# Patient Record
Sex: FEMALE | Race: WHITE | NOT HISPANIC OR LATINO | Employment: OTHER | URBAN - METROPOLITAN AREA
[De-identification: names, ages, dates, MRNs, and addresses within clinical notes are randomized per-mention and may not be internally consistent; named-entity substitution may affect disease eponyms.]

---

## 2021-05-26 ENCOUNTER — RECORDS - HEALTHEAST (OUTPATIENT)
Dept: ADMINISTRATIVE | Facility: CLINIC | Age: 81
End: 2021-05-26

## 2021-05-27 ENCOUNTER — RECORDS - HEALTHEAST (OUTPATIENT)
Dept: ADMINISTRATIVE | Facility: CLINIC | Age: 81
End: 2021-05-27

## 2021-05-28 ENCOUNTER — RECORDS - HEALTHEAST (OUTPATIENT)
Dept: ADMINISTRATIVE | Facility: CLINIC | Age: 81
End: 2021-05-28

## 2021-05-29 ENCOUNTER — RECORDS - HEALTHEAST (OUTPATIENT)
Dept: ADMINISTRATIVE | Facility: CLINIC | Age: 81
End: 2021-05-29

## 2021-05-31 ENCOUNTER — RECORDS - HEALTHEAST (OUTPATIENT)
Dept: ADMINISTRATIVE | Facility: CLINIC | Age: 81
End: 2021-05-31

## 2021-06-01 ENCOUNTER — RECORDS - HEALTHEAST (OUTPATIENT)
Dept: ADMINISTRATIVE | Facility: CLINIC | Age: 81
End: 2021-06-01

## 2021-09-20 ENCOUNTER — LAB REQUISITION (OUTPATIENT)
Dept: LAB | Facility: CLINIC | Age: 81
End: 2021-09-20
Payer: COMMERCIAL

## 2021-09-20 DIAGNOSIS — E55.9 VITAMIN D DEFICIENCY, UNSPECIFIED: ICD-10-CM

## 2021-09-20 DIAGNOSIS — E83.51 HYPOCALCEMIA: ICD-10-CM

## 2021-09-21 LAB
ANION GAP SERPL CALCULATED.3IONS-SCNC: 9 MMOL/L (ref 5–18)
BUN SERPL-MCNC: 15 MG/DL (ref 8–28)
CALCIUM SERPL-MCNC: 7.6 MG/DL (ref 8.5–10.5)
CHLORIDE BLD-SCNC: 109 MMOL/L (ref 98–107)
CO2 SERPL-SCNC: 24 MMOL/L (ref 22–31)
CREAT SERPL-MCNC: 0.71 MG/DL (ref 0.6–1.1)
DEPRECATED CALCIDIOL+CALCIFEROL SERPL-MC: 43 UG/L (ref 30–80)
GFR SERPL CREATININE-BSD FRML MDRD: 81 ML/MIN/1.73M2
GLUCOSE BLD-MCNC: 95 MG/DL (ref 70–125)
POTASSIUM BLD-SCNC: 4.2 MMOL/L (ref 3.5–5)
SODIUM SERPL-SCNC: 142 MMOL/L (ref 136–145)
TSH SERPL DL<=0.005 MIU/L-ACNC: 2.67 UIU/ML (ref 0.3–5)

## 2021-09-21 PROCEDURE — 82306 VITAMIN D 25 HYDROXY: CPT | Mod: ORL | Performed by: NURSE PRACTITIONER

## 2021-09-21 PROCEDURE — 84443 ASSAY THYROID STIM HORMONE: CPT | Mod: ORL | Performed by: NURSE PRACTITIONER

## 2021-09-21 PROCEDURE — 36415 COLL VENOUS BLD VENIPUNCTURE: CPT | Mod: ORL | Performed by: NURSE PRACTITIONER

## 2021-09-21 PROCEDURE — P9603 ONE-WAY ALLOW PRORATED MILES: HCPCS | Mod: ORL | Performed by: NURSE PRACTITIONER

## 2021-09-21 PROCEDURE — 80048 BASIC METABOLIC PNL TOTAL CA: CPT | Mod: ORL | Performed by: NURSE PRACTITIONER

## 2021-10-05 ENCOUNTER — TRANSFERRED RECORDS (OUTPATIENT)
Dept: HEALTH INFORMATION MANAGEMENT | Facility: CLINIC | Age: 81
End: 2021-10-05

## 2021-10-07 ENCOUNTER — LAB REQUISITION (OUTPATIENT)
Dept: LAB | Facility: CLINIC | Age: 81
End: 2021-10-07
Payer: COMMERCIAL

## 2021-10-07 ENCOUNTER — TRANSFERRED RECORDS (OUTPATIENT)
Dept: HEALTH INFORMATION MANAGEMENT | Facility: CLINIC | Age: 81
End: 2021-10-07

## 2021-10-07 DIAGNOSIS — R18.8 OTHER ASCITES: ICD-10-CM

## 2021-10-07 DIAGNOSIS — R60.9 EDEMA, UNSPECIFIED: ICD-10-CM

## 2021-10-07 LAB
ANION GAP SERPL CALCULATED.3IONS-SCNC: 9 MMOL/L (ref 5–18)
BNP SERPL-MCNC: 316 PG/ML (ref 0–151)
BUN SERPL-MCNC: 13 MG/DL (ref 8–28)
CALCIUM SERPL-MCNC: 8 MG/DL (ref 8.5–10.5)
CHLORIDE BLD-SCNC: 107 MMOL/L (ref 98–107)
CO2 SERPL-SCNC: 25 MMOL/L (ref 22–31)
CREAT SERPL-MCNC: 0.83 MG/DL (ref 0.6–1.1)
ERYTHROCYTE [DISTWIDTH] IN BLOOD BY AUTOMATED COUNT: 22.1 % (ref 10–15)
GFR SERPL CREATININE-BSD FRML MDRD: 67 ML/MIN/1.73M2
GLUCOSE BLD-MCNC: 145 MG/DL (ref 70–125)
HCT VFR BLD AUTO: 25.8 % (ref 35–47)
HGB BLD-MCNC: 7.1 G/DL (ref 11.7–15.7)
MCH RBC QN AUTO: 21.6 PG (ref 26.5–33)
MCHC RBC AUTO-ENTMCNC: 27.5 G/DL (ref 31.5–36.5)
MCV RBC AUTO: 78 FL (ref 78–100)
PLATELET # BLD AUTO: 123 10E3/UL (ref 150–450)
POTASSIUM BLD-SCNC: 3.8 MMOL/L (ref 3.5–5)
RBC # BLD AUTO: 3.29 10E6/UL (ref 3.8–5.2)
SODIUM SERPL-SCNC: 141 MMOL/L (ref 136–145)
WBC # BLD AUTO: 3.7 10E3/UL (ref 4–11)

## 2021-10-07 PROCEDURE — P9603 ONE-WAY ALLOW PRORATED MILES: HCPCS | Mod: ORL | Performed by: NURSE PRACTITIONER

## 2021-10-07 PROCEDURE — 83880 ASSAY OF NATRIURETIC PEPTIDE: CPT | Mod: ORL | Performed by: NURSE PRACTITIONER

## 2021-10-07 PROCEDURE — 80048 BASIC METABOLIC PNL TOTAL CA: CPT | Mod: ORL | Performed by: NURSE PRACTITIONER

## 2021-10-07 PROCEDURE — 36415 COLL VENOUS BLD VENIPUNCTURE: CPT | Mod: ORL | Performed by: NURSE PRACTITIONER

## 2021-10-07 PROCEDURE — 85027 COMPLETE CBC AUTOMATED: CPT | Mod: ORL | Performed by: NURSE PRACTITIONER

## 2021-10-08 ENCOUNTER — APPOINTMENT (OUTPATIENT)
Dept: CT IMAGING | Facility: CLINIC | Age: 81
DRG: 689 | End: 2021-10-08
Attending: STUDENT IN AN ORGANIZED HEALTH CARE EDUCATION/TRAINING PROGRAM
Payer: COMMERCIAL

## 2021-10-08 ENCOUNTER — HOSPITAL ENCOUNTER (INPATIENT)
Facility: CLINIC | Age: 81
LOS: 1 days | Discharge: ANOTHER HOSPITAL | DRG: 689 | End: 2021-10-09
Attending: STUDENT IN AN ORGANIZED HEALTH CARE EDUCATION/TRAINING PROGRAM | Admitting: EMERGENCY MEDICINE
Payer: COMMERCIAL

## 2021-10-08 DIAGNOSIS — I62.00 SUBDURAL HEMORRHAGE (H): Primary | ICD-10-CM

## 2021-10-08 DIAGNOSIS — N39.0 URINARY TRACT INFECTION WITHOUT HEMATURIA, SITE UNSPECIFIED: ICD-10-CM

## 2021-10-08 DIAGNOSIS — I82.401 ACUTE DEEP VEIN THROMBOSIS (DVT) OF RIGHT LOWER EXTREMITY, UNSPECIFIED VEIN (H): ICD-10-CM

## 2021-10-08 DIAGNOSIS — K92.2 GASTROINTESTINAL HEMORRHAGE, UNSPECIFIED GASTROINTESTINAL HEMORRHAGE TYPE: ICD-10-CM

## 2021-10-08 PROBLEM — D64.9 ANEMIA: Status: ACTIVE | Noted: 2021-10-08

## 2021-10-08 LAB
ABO/RH(D): NORMAL
ALBUMIN SERPL-MCNC: 2.3 G/DL (ref 3.5–5)
ALBUMIN UR-MCNC: 10 MG/DL
ALP SERPL-CCNC: 77 U/L (ref 45–120)
ALT SERPL W P-5'-P-CCNC: 22 U/L (ref 0–45)
ANION GAP SERPL CALCULATED.3IONS-SCNC: 6 MMOL/L (ref 5–18)
ANTIBODY SCREEN: NEGATIVE
APPEARANCE UR: CLEAR
AST SERPL W P-5'-P-CCNC: 51 U/L (ref 0–40)
ATRIAL RATE - MUSE: 67 BPM
BACTERIA #/AREA URNS HPF: ABNORMAL /HPF
BASE EXCESS BLDV CALC-SCNC: 3.7 MMOL/L
BASOPHILS # BLD AUTO: 0 10E3/UL (ref 0–0.2)
BASOPHILS NFR BLD AUTO: 0 %
BILIRUB SERPL-MCNC: 0.6 MG/DL (ref 0–1)
BILIRUB UR QL STRIP: NEGATIVE
BLD PROD TYP BPU: NORMAL
BLOOD COMPONENT TYPE: NORMAL
BUN SERPL-MCNC: 15 MG/DL (ref 8–28)
CALCIUM SERPL-MCNC: 8.4 MG/DL (ref 8.5–10.5)
CAOX CRY #/AREA URNS HPF: ABNORMAL /HPF
CHLORIDE BLD-SCNC: 108 MMOL/L (ref 98–107)
CO2 SERPL-SCNC: 26 MMOL/L (ref 22–31)
CODING SYSTEM: NORMAL
COLOR UR AUTO: YELLOW
CREAT SERPL-MCNC: 0.79 MG/DL (ref 0.6–1.1)
CROSSMATCH: NORMAL
DIASTOLIC BLOOD PRESSURE - MUSE: 61 MMHG
EOSINOPHIL # BLD AUTO: 0.1 10E3/UL (ref 0–0.7)
EOSINOPHIL NFR BLD AUTO: 3 %
ERYTHROCYTE [DISTWIDTH] IN BLOOD BY AUTOMATED COUNT: 22.1 % (ref 10–15)
GFR SERPL CREATININE-BSD FRML MDRD: 71 ML/MIN/1.73M2
GLUCOSE BLD-MCNC: 121 MG/DL (ref 70–125)
GLUCOSE UR STRIP-MCNC: NEGATIVE MG/DL
HCO3 BLDV-SCNC: 27 MMOL/L (ref 24–30)
HCT VFR BLD AUTO: 24.5 % (ref 35–47)
HGB BLD-MCNC: 6.8 G/DL (ref 11.7–15.7)
HGB UR QL STRIP: NEGATIVE
IMM GRANULOCYTES # BLD: 0 10E3/UL
IMM GRANULOCYTES NFR BLD: 1 %
INTERNAL QC CHECK POCT: ABNORMAL
INTERPRETATION ECG - MUSE: NORMAL
ISSUE DATE AND TIME: NORMAL
KETONES UR STRIP-MCNC: NEGATIVE MG/DL
LEUKOCYTE ESTERASE UR QL STRIP: NEGATIVE
LYMPHOCYTES # BLD AUTO: 0.8 10E3/UL (ref 0.8–5.3)
LYMPHOCYTES NFR BLD AUTO: 20 %
MCH RBC QN AUTO: 21.9 PG (ref 26.5–33)
MCHC RBC AUTO-ENTMCNC: 27.8 G/DL (ref 31.5–36.5)
MCV RBC AUTO: 79 FL (ref 78–100)
MONOCYTES # BLD AUTO: 0.5 10E3/UL (ref 0–1.3)
MONOCYTES NFR BLD AUTO: 12 %
MUCOUS THREADS #/AREA URNS LPF: PRESENT /LPF
NEUTROPHILS # BLD AUTO: 2.6 10E3/UL (ref 1.6–8.3)
NEUTROPHILS NFR BLD AUTO: 64 %
NITRATE UR QL: POSITIVE
NRBC # BLD AUTO: 0 10E3/UL
NRBC BLD AUTO-RTO: 0 /100
OCCULT BLOOD POCT: POSITIVE
OXYHGB MFR BLDV: 28.1 % (ref 70–75)
P AXIS - MUSE: NORMAL DEGREES
PCO2 BLDV: 51 MM HG (ref 35–50)
PH BLDV: 7.37 [PH] (ref 7.35–7.45)
PH UR STRIP: 6 [PH] (ref 5–7)
PLATELET # BLD AUTO: 104 10E3/UL (ref 150–450)
PO2 BLDV: 22 MM HG (ref 25–47)
POTASSIUM BLD-SCNC: 4.7 MMOL/L (ref 3.5–5)
PR INTERVAL - MUSE: 164 MS
PROT SERPL-MCNC: 6.9 G/DL (ref 6–8)
QRS DURATION - MUSE: 84 MS
QT - MUSE: 432 MS
QTC - MUSE: 456 MS
R AXIS - MUSE: 14 DEGREES
RBC # BLD AUTO: 3.11 10E6/UL (ref 3.8–5.2)
RBC URINE: 2 /HPF
SAO2 % BLDV: 28.8 % (ref 70–75)
SARS-COV-2 RNA RESP QL NAA+PROBE: NEGATIVE
SODIUM SERPL-SCNC: 140 MMOL/L (ref 136–145)
SP GR UR STRIP: 1.02 (ref 1–1.03)
SPECIMEN EXPIRATION DATE: NORMAL
SQUAMOUS EPITHELIAL: 1 /HPF
SYSTOLIC BLOOD PRESSURE - MUSE: 140 MMHG
T AXIS - MUSE: 74 DEGREES
TEST CARD EXPIRATION DATE: ABNORMAL
TEST CARD LOT NUMBER: ABNORMAL
TROPONIN I SERPL-MCNC: 0.01 NG/ML (ref 0–0.29)
UNIT ABO/RH: NORMAL
UNIT NUMBER: NORMAL
UNIT STATUS: NORMAL
UNIT TYPE ISBT: 6200
UROBILINOGEN UR STRIP-MCNC: 2 MG/DL
VENTRICULAR RATE- MUSE: 67 BPM
WBC # BLD AUTO: 3.9 10E3/UL (ref 4–11)
WBC URINE: 1 /HPF

## 2021-10-08 PROCEDURE — C9803 HOPD COVID-19 SPEC COLLECT: HCPCS

## 2021-10-08 PROCEDURE — 74177 CT ABD & PELVIS W/CONTRAST: CPT

## 2021-10-08 PROCEDURE — 36415 COLL VENOUS BLD VENIPUNCTURE: CPT | Performed by: STUDENT IN AN ORGANIZED HEALTH CARE EDUCATION/TRAINING PROGRAM

## 2021-10-08 PROCEDURE — 81001 URINALYSIS AUTO W/SCOPE: CPT | Performed by: STUDENT IN AN ORGANIZED HEALTH CARE EDUCATION/TRAINING PROGRAM

## 2021-10-08 PROCEDURE — 82272 OCCULT BLD FECES 1-3 TESTS: CPT | Performed by: STUDENT IN AN ORGANIZED HEALTH CARE EDUCATION/TRAINING PROGRAM

## 2021-10-08 PROCEDURE — 99223 1ST HOSP IP/OBS HIGH 75: CPT | Performed by: INTERNAL MEDICINE

## 2021-10-08 PROCEDURE — P9016 RBC LEUKOCYTES REDUCED: HCPCS | Performed by: STUDENT IN AN ORGANIZED HEALTH CARE EDUCATION/TRAINING PROGRAM

## 2021-10-08 PROCEDURE — 70450 CT HEAD/BRAIN W/O DYE: CPT

## 2021-10-08 PROCEDURE — 250N000011 HC RX IP 250 OP 636: Performed by: STUDENT IN AN ORGANIZED HEALTH CARE EDUCATION/TRAINING PROGRAM

## 2021-10-08 PROCEDURE — 96361 HYDRATE IV INFUSION ADD-ON: CPT

## 2021-10-08 PROCEDURE — 99285 EMERGENCY DEPT VISIT HI MDM: CPT | Mod: 25

## 2021-10-08 PROCEDURE — U0005 INFEC AGEN DETEC AMPLI PROBE: HCPCS | Performed by: FAMILY MEDICINE

## 2021-10-08 PROCEDURE — 86900 BLOOD TYPING SEROLOGIC ABO: CPT | Performed by: STUDENT IN AN ORGANIZED HEALTH CARE EDUCATION/TRAINING PROGRAM

## 2021-10-08 PROCEDURE — 250N000011 HC RX IP 250 OP 636: Performed by: EMERGENCY MEDICINE

## 2021-10-08 PROCEDURE — 258N000003 HC RX IP 258 OP 636: Performed by: STUDENT IN AN ORGANIZED HEALTH CARE EDUCATION/TRAINING PROGRAM

## 2021-10-08 PROCEDURE — 96375 TX/PRO/DX INJ NEW DRUG ADDON: CPT

## 2021-10-08 PROCEDURE — 82805 BLOOD GASES W/O2 SATURATION: CPT | Performed by: STUDENT IN AN ORGANIZED HEALTH CARE EDUCATION/TRAINING PROGRAM

## 2021-10-08 PROCEDURE — 93005 ELECTROCARDIOGRAM TRACING: CPT | Performed by: STUDENT IN AN ORGANIZED HEALTH CARE EDUCATION/TRAINING PROGRAM

## 2021-10-08 PROCEDURE — 86923 COMPATIBILITY TEST ELECTRIC: CPT | Performed by: STUDENT IN AN ORGANIZED HEALTH CARE EDUCATION/TRAINING PROGRAM

## 2021-10-08 PROCEDURE — 84484 ASSAY OF TROPONIN QUANT: CPT | Performed by: STUDENT IN AN ORGANIZED HEALTH CARE EDUCATION/TRAINING PROGRAM

## 2021-10-08 PROCEDURE — 96365 THER/PROPH/DIAG IV INF INIT: CPT | Mod: 59

## 2021-10-08 PROCEDURE — 36415 COLL VENOUS BLD VENIPUNCTURE: CPT | Performed by: EMERGENCY MEDICINE

## 2021-10-08 PROCEDURE — C9113 INJ PANTOPRAZOLE SODIUM, VIA: HCPCS | Performed by: STUDENT IN AN ORGANIZED HEALTH CARE EDUCATION/TRAINING PROGRAM

## 2021-10-08 PROCEDURE — 120N000001 HC R&B MED SURG/OB

## 2021-10-08 PROCEDURE — 82040 ASSAY OF SERUM ALBUMIN: CPT | Performed by: STUDENT IN AN ORGANIZED HEALTH CARE EDUCATION/TRAINING PROGRAM

## 2021-10-08 PROCEDURE — 51702 INSERT TEMP BLADDER CATH: CPT

## 2021-10-08 PROCEDURE — 85025 COMPLETE CBC W/AUTO DIFF WBC: CPT | Performed by: STUDENT IN AN ORGANIZED HEALTH CARE EDUCATION/TRAINING PROGRAM

## 2021-10-08 PROCEDURE — 87040 BLOOD CULTURE FOR BACTERIA: CPT | Performed by: STUDENT IN AN ORGANIZED HEALTH CARE EDUCATION/TRAINING PROGRAM

## 2021-10-08 PROCEDURE — 36430 TRANSFUSION BLD/BLD COMPNT: CPT

## 2021-10-08 PROCEDURE — 87086 URINE CULTURE/COLONY COUNT: CPT | Performed by: STUDENT IN AN ORGANIZED HEALTH CARE EDUCATION/TRAINING PROGRAM

## 2021-10-08 RX ORDER — IBUPROFEN 200 MG
400 TABLET ORAL EVERY 6 HOURS PRN
Status: ON HOLD | COMMUNITY
End: 2021-10-11

## 2021-10-08 RX ORDER — CEFTRIAXONE 1 G/1
1 INJECTION, POWDER, FOR SOLUTION INTRAMUSCULAR; INTRAVENOUS ONCE
Status: COMPLETED | OUTPATIENT
Start: 2021-10-08 | End: 2021-10-09

## 2021-10-08 RX ORDER — SODIUM CHLORIDE, SODIUM LACTATE, POTASSIUM CHLORIDE, CALCIUM CHLORIDE 600; 310; 30; 20 MG/100ML; MG/100ML; MG/100ML; MG/100ML
INJECTION, SOLUTION INTRAVENOUS ONCE
Status: COMPLETED | OUTPATIENT
Start: 2021-10-08 | End: 2021-10-09

## 2021-10-08 RX ORDER — IOPAMIDOL 755 MG/ML
100 INJECTION, SOLUTION INTRAVASCULAR ONCE
Status: COMPLETED | OUTPATIENT
Start: 2021-10-08 | End: 2021-10-08

## 2021-10-08 RX ORDER — NADOLOL 40 MG/1
40 TABLET ORAL EVERY EVENING
Status: ON HOLD | COMMUNITY
Start: 2021-05-07 | End: 2021-10-11

## 2021-10-08 RX ORDER — DONEPEZIL HYDROCHLORIDE 10 MG/1
10 TABLET, FILM COATED ORAL DAILY
COMMUNITY
Start: 2020-10-22

## 2021-10-08 RX ORDER — NADOLOL 40 MG/1
40 TABLET ORAL EVERY EVENING
COMMUNITY
End: 2021-10-09

## 2021-10-08 RX ORDER — SERTRALINE HYDROCHLORIDE 100 MG/1
150 TABLET, FILM COATED ORAL DAILY
COMMUNITY
Start: 2021-09-30

## 2021-10-08 RX ADMIN — SODIUM CHLORIDE, POTASSIUM CHLORIDE, SODIUM LACTATE AND CALCIUM CHLORIDE: 600; 310; 30; 20 INJECTION, SOLUTION INTRAVENOUS at 22:00

## 2021-10-08 RX ADMIN — IOPAMIDOL 100 ML: 755 INJECTION, SOLUTION INTRAVENOUS at 20:44

## 2021-10-08 RX ADMIN — CEFTRIAXONE 1 G: 1 INJECTION, POWDER, FOR SOLUTION INTRAMUSCULAR; INTRAVENOUS at 23:54

## 2021-10-08 RX ADMIN — PANTOPRAZOLE SODIUM 40 MG: 40 INJECTION, POWDER, FOR SOLUTION INTRAVENOUS at 20:10

## 2021-10-08 NOTE — ED TRIAGE NOTES
Pt presents to ED via EMS from a memory care unit in [place]. EMS reports that the NP at the facility called to bring her in, Hgb 7.1 . EMS also reports abdominal distention, facility states hx of this. Pt was very weak transferring beds.

## 2021-10-08 NOTE — ED PROVIDER NOTES
Emergency Department Encounter         FINAL IMPRESSION:  Anemia, weakness        ED COURSE AND MEDICAL DECISION MAKING       ED Course as of Oct 08 1432   Fri Oct 08, 2021   1354 Patient was admitted after here from patient dementia and her  exactly.  Patient has not complaints on arrival.  She denies any abdominal pain or distention.  However triage note this is a she denies any chest bloody stools.  No urination issues.  X1.  Cannot tell me the year or the month.  His examination overall is unremarkable.  Heart and lungs normal.  Abdomen is benign.  Is not painful.  Does not appear to be significantly      1409 EKG is sinus rhythm with nonspecific ST changes, no depressions, no inversions no STEMI , essentially unchanged from 2003.           Critical Care     Performed by: Sav Horne  Authorized by: Sav Horne  Total critical care time: 60 minutes  Critical care was necessary to treat or prevent imminent or life-threatening deterioration of the following conditions: anemia requiring blood  Critical care was time spent personally by me on the following activities: development of treatment plan with patient or surrogate, discussions with consultants, examination of patient, evaluation of patient's response to treatment, obtaining history from patient or surrogate, ordering and performing treatments and interventions, ordering and review of laboratory studies, ordering and review of radiographic studies, re-evaluation of patient's condition and monitoring for potential decompensation.  Critical care time was exclusive of separately billable procedures and treating other patients.      -6.8 hemoglobin.  1 unit given.  CT and abdomen pending.  Protonix given as she was Hemoccult positive.  Admitted to the hospitalist as a border.  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  Signed out to oncoming physician to follow-up/obtain recommendations from specialist                    At the conclusion of  the encounter I discussed the results of all the tests and the disposition. The questions were answered. The patient or family acknowledged understanding and was agreeable with the care plan.                  MEDICATIONS GIVEN IN THE EMERGENCY DEPARTMENT:  Medications - No data to display    NEW PRESCRIPTIONS STARTED AT TODAY'S ED VISIT:  New Prescriptions    No medications on file       HPI     Patient information obtained from:     Use of Interpretor: N/A    Jonas Rodriguez is a 81 year old female with a pertinent history of cancer, diabetes mellitus, autoimmune disorder, hypercholestaraemia, and liver disorder, who presents to this ED via EMS for evaluation of abnormal labs and weakness.    Patient admitted here from memory care for patient dementia.  Patient has no complaints on arrival.  She denies any abdominal pain or distention.  However triage notes that she denies any chest pain or bloody stools.  No urination issues.  Cannot tell the year or the month.       REVIEW OF SYSTEMS:  Review of Systems   Constitutional: Negative for fever, malaise  HEENT: Negative runny nose, sore throat, ear pain, neck pain  Respiratory: Negative for shortness of breath, cough, congestion  Cardiovascular: Negative for chest pain, leg edema  Gastrointestinal: Negative for abdominal distention, abdominal pain, constipation, vomiting, nausea, diarrhea  Genitourinary: Negative for dysuria and hematuria.   Integument: Negative for rash, skin breakdown  Neurological: Negative for paresthesias, weakness, headache.  Musculoskeletal: Negative for joint pain, joint swelling      All other systems reviewed and are negative.          MEDICAL HISTORY     Past Medical History:   Diagnosis Date     Anxiety      Autoimmune disorder (H)      Cancer (H)      Diabetes mellitus (H)      Elbow abrasion 10/6/2013     Fall from standing 10/6/2013     Gastro-oesophageal reflux disease      Hypercholesteraemia      Liver disorder      Vitamin  deficiency        No past surgical history on file.    Social History     Tobacco Use     Smoking status: Not on file   Substance Use Topics     Alcohol use: No     Drug use: No       bacitracin ointment  Cholecalciferol (VITAMIN D3 PO)  levETIRAcetam (KEPPRA) 250 MG tablet  NADOLOL  Omeprazole (PRILOSEC PO)  Sertraline HCl (ZOLOFT PO)  SIMVASTATIN PO            PHYSICAL EXAM     BP (!) 152/65   Pulse 63   Temp 98.4  F (36.9  C) (Oral)   Resp 18   SpO2 97%       PHYSICAL EXAM:     General: Patient appears well, nontoxic, comfortable  HEENT: Moist mucous membranes, no tongue swelling.  No head trauma.  No midline neck pain.  Cardiovascular: Normal rate, normal rhythm, no extremity edema.  No appreciable murmur.  Respiratory: No signs of respiratory distress, lungs are clear to auscultation bilaterally with no wheezes rhonchi or rales.  Abdominal: Soft, nontender, nondistended, no palpable masses, no guarding, no rebound, brown stool  Musculoskeletal: Full range of motion of joints, no deformities appreciated.  Neurological: Alert and oriented x1, cannot tell the year and month.  Psychological: Normal affect and mood.  Integument: No rashes appreciated      RESULTS       Labs Ordered and Resulted from Time of ED Arrival Up to the Time of Departure from the ED - No data to display    Head CT w/o contrast    (Results Pending)   CT Abdomen Pelvis w Contrast    (Results Pending)                     PROCEDURES:  Procedures:  Procedures       I, Terri Nash am serving as a scribe to document services personally performed by Sav Horne DO, based on my observations and the provider's statements to me.  IaSv DO, attest that Terri Nash is acting in a scribe capacity, has observed my performance of the services and has documented them in accordance with my direction.    Sav Horne DO  Emergency Medicine  [facility] EMERGENCY ROOM     Sav Horne DO  10/08/21 2053       Sav Horne  DO Lui  10/09/21 115

## 2021-10-09 ENCOUNTER — APPOINTMENT (OUTPATIENT)
Dept: CT IMAGING | Facility: CLINIC | Age: 81
DRG: 689 | End: 2021-10-09
Attending: EMERGENCY MEDICINE
Payer: COMMERCIAL

## 2021-10-09 ENCOUNTER — HOSPITAL ENCOUNTER (INPATIENT)
Facility: CLINIC | Age: 81
LOS: 3 days | Discharge: HOSPICE | DRG: 064 | End: 2021-10-12
Attending: INTERNAL MEDICINE | Admitting: STUDENT IN AN ORGANIZED HEALTH CARE EDUCATION/TRAINING PROGRAM
Payer: COMMERCIAL

## 2021-10-09 ENCOUNTER — APPOINTMENT (OUTPATIENT)
Dept: ULTRASOUND IMAGING | Facility: CLINIC | Age: 81
DRG: 689 | End: 2021-10-09
Attending: INTERNAL MEDICINE
Payer: COMMERCIAL

## 2021-10-09 VITALS
TEMPERATURE: 97.3 F | SYSTOLIC BLOOD PRESSURE: 116 MMHG | HEART RATE: 63 BPM | DIASTOLIC BLOOD PRESSURE: 58 MMHG | RESPIRATION RATE: 21 BRPM | OXYGEN SATURATION: 97 %

## 2021-10-09 DIAGNOSIS — N39.0 URINARY TRACT INFECTION WITHOUT HEMATURIA, SITE UNSPECIFIED: Primary | ICD-10-CM

## 2021-10-09 PROBLEM — K92.2 GASTROINTESTINAL HEMORRHAGE, UNSPECIFIED GASTROINTESTINAL HEMORRHAGE TYPE: Status: ACTIVE | Noted: 2021-10-09

## 2021-10-09 PROBLEM — I62.00 SUBDURAL HEMORRHAGE (H): Status: ACTIVE | Noted: 2021-10-09

## 2021-10-09 PROBLEM — I82.401 ACUTE DEEP VEIN THROMBOSIS (DVT) OF RIGHT LOWER EXTREMITY, UNSPECIFIED VEIN (H): Status: ACTIVE | Noted: 2021-10-09

## 2021-10-09 LAB
ALBUMIN SERPL-MCNC: 1.8 G/DL (ref 3.4–5)
ALP SERPL-CCNC: 70 U/L (ref 40–150)
ALT SERPL W P-5'-P-CCNC: 27 U/L (ref 0–50)
ANION GAP SERPL CALCULATED.3IONS-SCNC: 5 MMOL/L (ref 3–14)
ANION GAP SERPL CALCULATED.3IONS-SCNC: 9 MMOL/L (ref 5–18)
APTT PPP: 36 SECONDS (ref 22–38)
AST SERPL W P-5'-P-CCNC: 63 U/L (ref 0–45)
BILIRUB DIRECT SERPL-MCNC: 0.4 MG/DL (ref 0–0.2)
BILIRUB SERPL-MCNC: 1 MG/DL (ref 0.2–1.3)
BUN SERPL-MCNC: 13 MG/DL (ref 8–28)
BUN SERPL-MCNC: 16 MG/DL (ref 7–30)
CALCIUM SERPL-MCNC: 7.8 MG/DL (ref 8.5–10.5)
CALCIUM SERPL-MCNC: 7.9 MG/DL (ref 8.5–10.1)
CHLORIDE BLD-SCNC: 107 MMOL/L (ref 98–107)
CHLORIDE BLD-SCNC: 111 MMOL/L (ref 94–109)
CO2 SERPL-SCNC: 23 MMOL/L (ref 22–31)
CO2 SERPL-SCNC: 26 MMOL/L (ref 20–32)
CREAT SERPL-MCNC: 0.75 MG/DL (ref 0.6–1.1)
CREAT SERPL-MCNC: 0.81 MG/DL (ref 0.52–1.04)
ERYTHROCYTE [DISTWIDTH] IN BLOOD BY AUTOMATED COUNT: 21.5 % (ref 10–15)
ERYTHROCYTE [DISTWIDTH] IN BLOOD BY AUTOMATED COUNT: 22 % (ref 10–15)
GFR SERPL CREATININE-BSD FRML MDRD: 69 ML/MIN/1.73M2
GFR SERPL CREATININE-BSD FRML MDRD: 76 ML/MIN/1.73M2
GLUCOSE BLD-MCNC: 84 MG/DL (ref 70–99)
GLUCOSE BLD-MCNC: 86 MG/DL (ref 70–125)
HCT VFR BLD AUTO: 27.5 % (ref 35–47)
HCT VFR BLD AUTO: 28.9 % (ref 35–47)
HGB BLD-MCNC: 7.9 G/DL (ref 11.7–15.7)
HGB BLD-MCNC: 8.3 G/DL (ref 11.7–15.7)
INR PPP: 1.52 (ref 0.85–1.15)
INR PPP: 1.53 (ref 0.85–1.15)
MCH RBC QN AUTO: 22.8 PG (ref 26.5–33)
MCH RBC QN AUTO: 23 PG (ref 26.5–33)
MCHC RBC AUTO-ENTMCNC: 28.7 G/DL (ref 31.5–36.5)
MCHC RBC AUTO-ENTMCNC: 28.7 G/DL (ref 31.5–36.5)
MCV RBC AUTO: 79 FL (ref 78–100)
MCV RBC AUTO: 80 FL (ref 78–100)
PLATELET # BLD AUTO: 89 10E3/UL (ref 150–450)
PLATELET # BLD AUTO: 97 10E3/UL (ref 150–450)
POTASSIUM BLD-SCNC: 3.9 MMOL/L (ref 3.4–5.3)
POTASSIUM BLD-SCNC: 4.1 MMOL/L (ref 3.5–5)
PROT SERPL-MCNC: 6.7 G/DL (ref 6.8–8.8)
RBC # BLD AUTO: 3.47 10E6/UL (ref 3.8–5.2)
RBC # BLD AUTO: 3.61 10E6/UL (ref 3.8–5.2)
SODIUM SERPL-SCNC: 139 MMOL/L (ref 136–145)
SODIUM SERPL-SCNC: 142 MMOL/L (ref 133–144)
WBC # BLD AUTO: 2.9 10E3/UL (ref 4–11)
WBC # BLD AUTO: 3.4 10E3/UL (ref 4–11)

## 2021-10-09 PROCEDURE — 250N000013 HC RX MED GY IP 250 OP 250 PS 637: Performed by: INTERNAL MEDICINE

## 2021-10-09 PROCEDURE — 85610 PROTHROMBIN TIME: CPT | Performed by: PHYSICIAN ASSISTANT

## 2021-10-09 PROCEDURE — 99207 PR APP CREDIT; MD BILLING SHARED VISIT: CPT | Performed by: PHYSICIAN ASSISTANT

## 2021-10-09 PROCEDURE — 36415 COLL VENOUS BLD VENIPUNCTURE: CPT | Performed by: INTERNAL MEDICINE

## 2021-10-09 PROCEDURE — 82248 BILIRUBIN DIRECT: CPT | Performed by: PHYSICIAN ASSISTANT

## 2021-10-09 PROCEDURE — 70450 CT HEAD/BRAIN W/O DYE: CPT | Mod: 77

## 2021-10-09 PROCEDURE — 85027 COMPLETE CBC AUTOMATED: CPT | Performed by: PHYSICIAN ASSISTANT

## 2021-10-09 PROCEDURE — 96361 HYDRATE IV INFUSION ADD-ON: CPT

## 2021-10-09 PROCEDURE — 85730 THROMBOPLASTIN TIME PARTIAL: CPT | Performed by: INTERNAL MEDICINE

## 2021-10-09 PROCEDURE — 85610 PROTHROMBIN TIME: CPT | Performed by: INTERNAL MEDICINE

## 2021-10-09 PROCEDURE — 99222 1ST HOSP IP/OBS MODERATE 55: CPT | Mod: GC | Performed by: NEUROLOGICAL SURGERY

## 2021-10-09 PROCEDURE — 258N000003 HC RX IP 258 OP 636: Performed by: INTERNAL MEDICINE

## 2021-10-09 PROCEDURE — 99223 1ST HOSP IP/OBS HIGH 75: CPT | Mod: AI | Performed by: STUDENT IN AN ORGANIZED HEALTH CARE EDUCATION/TRAINING PROGRAM

## 2021-10-09 PROCEDURE — 80048 BASIC METABOLIC PNL TOTAL CA: CPT | Performed by: PHYSICIAN ASSISTANT

## 2021-10-09 PROCEDURE — 120N000002 HC R&B MED SURG/OB UMMC

## 2021-10-09 PROCEDURE — 96375 TX/PRO/DX INJ NEW DRUG ADDON: CPT

## 2021-10-09 PROCEDURE — 70450 CT HEAD/BRAIN W/O DYE: CPT

## 2021-10-09 PROCEDURE — 250N000013 HC RX MED GY IP 250 OP 250 PS 637: Performed by: PHYSICIAN ASSISTANT

## 2021-10-09 PROCEDURE — 36415 COLL VENOUS BLD VENIPUNCTURE: CPT | Performed by: PHYSICIAN ASSISTANT

## 2021-10-09 PROCEDURE — 36415 COLL VENOUS BLD VENIPUNCTURE: CPT | Performed by: EMERGENCY MEDICINE

## 2021-10-09 PROCEDURE — 85027 COMPLETE CBC AUTOMATED: CPT | Performed by: EMERGENCY MEDICINE

## 2021-10-09 PROCEDURE — 80048 BASIC METABOLIC PNL TOTAL CA: CPT | Performed by: EMERGENCY MEDICINE

## 2021-10-09 PROCEDURE — 93970 EXTREMITY STUDY: CPT

## 2021-10-09 PROCEDURE — 250N000013 HC RX MED GY IP 250 OP 250 PS 637: Performed by: STUDENT IN AN ORGANIZED HEALTH CARE EDUCATION/TRAINING PROGRAM

## 2021-10-09 PROCEDURE — 250N000011 HC RX IP 250 OP 636: Performed by: INTERNAL MEDICINE

## 2021-10-09 PROCEDURE — 258N000003 HC RX IP 258 OP 636: Performed by: EMERGENCY MEDICINE

## 2021-10-09 RX ORDER — NADOLOL 40 MG/1
40 TABLET ORAL EVERY EVENING
Status: DISCONTINUED | OUTPATIENT
Start: 2021-10-10 | End: 2021-10-11

## 2021-10-09 RX ORDER — LIDOCAINE 40 MG/G
CREAM TOPICAL
Status: DISCONTINUED | OUTPATIENT
Start: 2021-10-09 | End: 2021-10-09 | Stop reason: HOSPADM

## 2021-10-09 RX ORDER — CEFTRIAXONE 1 G/1
1 INJECTION, POWDER, FOR SOLUTION INTRAMUSCULAR; INTRAVENOUS EVERY 24 HOURS
Status: DISCONTINUED | OUTPATIENT
Start: 2021-10-09 | End: 2021-10-09 | Stop reason: HOSPADM

## 2021-10-09 RX ORDER — ONDANSETRON 4 MG/1
4 TABLET, ORALLY DISINTEGRATING ORAL EVERY 6 HOURS PRN
Status: DISCONTINUED | OUTPATIENT
Start: 2021-10-09 | End: 2021-10-12 | Stop reason: HOSPADM

## 2021-10-09 RX ORDER — NADOLOL 40 MG/1
60 TABLET ORAL DAILY
Status: ON HOLD | COMMUNITY
End: 2021-10-11

## 2021-10-09 RX ORDER — NADOLOL 20 MG/1
40 TABLET ORAL EVERY EVENING
Status: DISCONTINUED | OUTPATIENT
Start: 2021-10-09 | End: 2021-10-09 | Stop reason: HOSPADM

## 2021-10-09 RX ORDER — BUSPIRONE HYDROCHLORIDE 5 MG/1
5 TABLET ORAL DAILY
Status: DISCONTINUED | OUTPATIENT
Start: 2021-10-10 | End: 2021-10-12 | Stop reason: HOSPADM

## 2021-10-09 RX ORDER — NADOLOL 20 MG/1
60 TABLET ORAL EVERY MORNING
Status: DISCONTINUED | OUTPATIENT
Start: 2021-10-10 | End: 2021-10-09 | Stop reason: HOSPADM

## 2021-10-09 RX ORDER — CEFTRIAXONE 1 G/1
1 INJECTION, POWDER, FOR SOLUTION INTRAMUSCULAR; INTRAVENOUS EVERY 24 HOURS
Status: DISCONTINUED | OUTPATIENT
Start: 2021-10-10 | End: 2021-10-11

## 2021-10-09 RX ORDER — BUSPIRONE HYDROCHLORIDE 5 MG/1
5 TABLET ORAL DAILY
Status: DISCONTINUED | OUTPATIENT
Start: 2021-10-09 | End: 2021-10-09 | Stop reason: HOSPADM

## 2021-10-09 RX ORDER — LIDOCAINE 40 MG/G
CREAM TOPICAL
Status: DISCONTINUED | OUTPATIENT
Start: 2021-10-09 | End: 2021-10-12 | Stop reason: HOSPADM

## 2021-10-09 RX ORDER — SIMVASTATIN 20 MG
20 TABLET ORAL EVERY EVENING
Status: DISCONTINUED | OUTPATIENT
Start: 2021-10-09 | End: 2021-10-09 | Stop reason: HOSPADM

## 2021-10-09 RX ORDER — ONDANSETRON 2 MG/ML
4 INJECTION INTRAMUSCULAR; INTRAVENOUS EVERY 6 HOURS PRN
Status: DISCONTINUED | OUTPATIENT
Start: 2021-10-09 | End: 2021-10-12 | Stop reason: HOSPADM

## 2021-10-09 RX ORDER — SIMVASTATIN 20 MG
20 TABLET ORAL EVERY EVENING
Status: DISCONTINUED | OUTPATIENT
Start: 2021-10-09 | End: 2021-10-12 | Stop reason: HOSPADM

## 2021-10-09 RX ORDER — SODIUM CHLORIDE 9 MG/ML
INJECTION, SOLUTION INTRAVENOUS CONTINUOUS
Status: DISCONTINUED | OUTPATIENT
Start: 2021-10-09 | End: 2021-10-09 | Stop reason: HOSPADM

## 2021-10-09 RX ORDER — VITAMIN B COMPLEX
50 TABLET ORAL DAILY
Status: DISCONTINUED | OUTPATIENT
Start: 2021-10-10 | End: 2021-10-12 | Stop reason: HOSPADM

## 2021-10-09 RX ORDER — MEMANTINE HYDROCHLORIDE 10 MG/1
10 TABLET ORAL 2 TIMES DAILY
Status: DISCONTINUED | OUTPATIENT
Start: 2021-10-09 | End: 2021-10-12 | Stop reason: HOSPADM

## 2021-10-09 RX ORDER — CALCIUM CARBONATE 500 MG/1
1000 TABLET, CHEWABLE ORAL 4 TIMES DAILY PRN
Status: DISCONTINUED | OUTPATIENT
Start: 2021-10-09 | End: 2021-10-12 | Stop reason: HOSPADM

## 2021-10-09 RX ORDER — DONEPEZIL HYDROCHLORIDE 10 MG/1
10 TABLET, FILM COATED ORAL DAILY
Status: DISCONTINUED | OUTPATIENT
Start: 2021-10-09 | End: 2021-10-09 | Stop reason: HOSPADM

## 2021-10-09 RX ORDER — MEMANTINE HYDROCHLORIDE 10 MG/1
10 TABLET ORAL 2 TIMES DAILY
Status: DISCONTINUED | OUTPATIENT
Start: 2021-10-09 | End: 2021-10-09 | Stop reason: HOSPADM

## 2021-10-09 RX ORDER — HYDROMORPHONE HCL IN WATER/PF 6 MG/30 ML
0.1 PATIENT CONTROLLED ANALGESIA SYRINGE INTRAVENOUS EVERY 6 HOURS PRN
Status: DISCONTINUED | OUTPATIENT
Start: 2021-10-09 | End: 2021-10-09

## 2021-10-09 RX ORDER — DONEPEZIL HYDROCHLORIDE 10 MG/1
10 TABLET, FILM COATED ORAL DAILY
Status: DISCONTINUED | OUTPATIENT
Start: 2021-10-10 | End: 2021-10-12 | Stop reason: HOSPADM

## 2021-10-09 RX ORDER — BUSPIRONE HYDROCHLORIDE 5 MG/1
5 TABLET ORAL DAILY
COMMUNITY

## 2021-10-09 RX ORDER — HYDROXYZINE HYDROCHLORIDE 25 MG/1
25 TABLET, FILM COATED ORAL 3 TIMES DAILY PRN
Status: DISCONTINUED | OUTPATIENT
Start: 2021-10-09 | End: 2021-10-09 | Stop reason: HOSPADM

## 2021-10-09 RX ORDER — MEMANTINE HYDROCHLORIDE 10 MG/1
10 TABLET ORAL 2 TIMES DAILY
COMMUNITY

## 2021-10-09 RX ADMIN — SODIUM CHLORIDE 250 ML: 9 INJECTION, SOLUTION INTRAVENOUS at 10:42

## 2021-10-09 RX ADMIN — SIMVASTATIN 20 MG: 20 TABLET, FILM COATED ORAL at 21:48

## 2021-10-09 RX ADMIN — CALCIUM CARBONATE 600 MG (1,500 MG)-VITAMIN D3 400 UNIT TABLET 1 TABLET: at 21:47

## 2021-10-09 RX ADMIN — SERTRALINE HYDROCHLORIDE 150 MG: 100 TABLET, FILM COATED ORAL at 18:02

## 2021-10-09 RX ADMIN — SODIUM CHLORIDE: 9 INJECTION, SOLUTION INTRAVENOUS at 04:40

## 2021-10-09 RX ADMIN — MEMANTINE 10 MG: 10 TABLET ORAL at 21:48

## 2021-10-09 RX ADMIN — PROCHLORPERAZINE EDISYLATE 5 MG: 5 INJECTION INTRAMUSCULAR; INTRAVENOUS at 06:25

## 2021-10-09 RX ADMIN — DONEPEZIL HYDROCHLORIDE 10 MG: 10 TABLET, FILM COATED ORAL at 18:02

## 2021-10-09 RX ADMIN — BUSPIRONE HYDROCHLORIDE 5 MG: 5 TABLET ORAL at 18:02

## 2021-10-09 RX ADMIN — HYDROXYZINE HYDROCHLORIDE 25 MG: 25 TABLET ORAL at 01:06

## 2021-10-09 RX ADMIN — NADOLOL 40 MG: 20 TABLET ORAL at 18:02

## 2021-10-09 ASSESSMENT — VISUAL ACUITY: OU: BASELINE;GLASSES

## 2021-10-09 NOTE — PROGRESS NOTES
[facility] Daily Progress Note    Assessment/Plan:  Agree with note as documented by Dr. Andrade.     Discussed with Dr. Wright.  She will arrange for transfer given patient's abnormal CT findings including subdural hematoma, and abnormal ultrasound findings revealing gastrocnemius venous thrombosis.  Patient requires neurosurgery and likely IVC filter.  This care cannot be provided adequately at [facility].    Active Problems:    Anemia    Subdural hemorrhage (H)    Urinary tract infection without hematuria, site unspecified    Gastrointestinal hemorrhage, unspecified gastrointestinal hemorrhage type    Acute deep vein thrombosis (DVT) of right lower extremity, unspecified vein (H)    Objective:  Vital signs in last 24 hours:  Temp:  [97  F (36.1  C)-99.1  F (37.3  C)] 98.4  F (36.9  C)  Pulse:  [52-67] 59  Resp:  [13-29] 22  BP: ()/(49-70) 170/70  SpO2:  [92 %-100 %] 97 %  Weight:   Weight:   @THISENCWEIGHTS(1)@  Weight change:   There is no height or weight on file to calculate BMI.    Intake/Output last 3 shifts:  I/O last 3 completed shifts:  In: 350   Out: -   Intake/Output this shift:  No intake/output data recorded.    Review of Systems:   As per subjective, all others negative.    Imaging:  Personally Reviewed.  Results for orders placed or performed during the hospital encounter of 10/08/21   Head CT w/o contrast    Impression    IMPRESSION:  1.  Interval development of a chronic subdural hematoma or chronic subdural hygroma along the left cerebral convexity measuring 9 mm maximum thickness resulting in 4-5 mm rightward midline shift of the septum pellucidum.  2.  No CT evidence for acute intracranial process.  3.  Brain atrophy and presumed chronic microvascular ischemic changes as above.   CT Abdomen Pelvis w Contrast    Impression    IMPRESSION:   1.  CT appearance of hepatic cirrhosis and portal hypertension with a large volume of ascites. Colon wall thickening is likely from hepatic  dysfunction.    Head CT w/o contrast    Impression    IMPRESSION:  1.  Increased density of the bilateral subdural collections, without significant change in volume or mass effect. While findings may be related to recent contrast administration, interval acute hemorrhage cannot be entirely excluded. Recommend short   interval follow-up.    Findings were discussed with Dr. Guallpa at 6:55 AM on 10/09/2021.     US Lower Extremity Venous Duplex Bilateral    Impression    IMPRESSION:  1.  Short segment of occlusive thrombus in the right gastrocnemius vein of the upper calf.  2.  No other deep venous thrombosis.   Head CT w/o contrast    Impression    IMPRESSION:  1.  Bilateral left larger than right subdural hematomas are unchanged compared with the examination from earlier today.  2.  There is probably a small amount of subdural blood along the interhemispheric fissure that is new or increased compared to the CT earlier today.       Lab Results:  Personally Reviewed.   Recent Labs   Lab 10/09/21  1040 10/08/21  1500 10/07/21  1220   WBC 2.9* 3.9* 3.7*   HGB 8.3* 6.8* 7.1*   HCT 28.9* 24.5* 25.8*   PLT 89* 104* 123*     Recent Labs   Lab 10/09/21  1040 10/08/21  1507 10/07/21  1220    140 141   CO2 23 26 25   BUN 13 15 13   ALBUMIN  --  2.3*  --    ALKPHOS  --  77  --    ALT  --  22  --    AST  --  51*  --      Recent Labs   Lab 10/09/21  0146   INR 1.52*       I reviewed all labs and imaging studies as of this date and I reviewed all current inpatient medications and updated them    Alex Elizondo DO, MS  [facility]  Internal Medicine

## 2021-10-09 NOTE — ED PROVIDER NOTES
eMERGENCY dEPARTMENT PROGRESS NOTE         ED COURSE AND MEDICAL DECISION MAKING  10:00 PM Patient was signed out to me by Dr. Sav Horne.  11:30 PM I spoke with KATHERINE Sandoval from neurosurgery.  11:32 PM I rechecked the patient and updated family.  11:37 PM I spoke with KATHERINE Sandoval from neurosurgery.  11:42 PM I spoke with the hospitalist Dr. Andrade in person.    Jonas Rodriguez is a 81 year old female who presented to the ED for evaluation of weakness.  From a memory care unit history limited.  Was found on work-up to have a urinary tract infection and anemia.  Was initiated on a blood transfusion for symptomatic anemia and received antibiotics for urinary tract infection.  As part of her work-up she received CT scan imaging which demonstrated a chronic subdural hematoma.  There was shift associated with that.  The timing of this is not clear in discussion with the  he does not recall any recent falls but reports that it is common that the patient falls and specifically noting couple of months ago there was a more significant fall and then she had several falls a few weeks ago in the kitchen.  He does not recall any significant head injury recently.  On examination the patient's scalp I did not appreciate any bruising or discoloration which would support this as a chronic subdural.  I discussed the case with neurosurgery who recommended a repeat CT scan in approximately 6 hours.  There are no neurological beds available in the community and so at this point were going to plan to board the patient in the emergency department for repeat CT scan.  If the CT scan is unchanged compared to the initial CT scan and demonstrating stability then at that point we would proceed with admission to treat patient's weakness secondary to confusion and anemia and plan for neurosurgical consultation in the hospital updated the patient's  on these test results.  We tried to verify if this would be an issue that  they would want to proceed with a surgical intervention if indicated and at this point the  is deferring that decision to a ladder point.  Perhaps, the repeat CT scan will clarify whether this is stable or worsening.  Added on coags after discussion with neurosurgery.    Patient signed out to oncoming MD with repeat CT scan head pending.      11:56 PM  PHYSICAL EXAM    VITAL SIGNS: BP (!) 148/66   Pulse 62   Temp 98.4  F (36.9  C) (Oral)   Resp 20   SpO2 99%   Constitutional: Elderly female, awake alert interacting appropriately.  No evidence of altered mental status at this time.  EYES: Conjunctivae clear, no discharge  HENT: Atraumatic, normocephalic, bilateral external ears normal.  Oropharynx moist. Nose normal.   Neck: Normal ROM , Supple   Respiratory:  No respiratory distress, normal nonlabored respirations.   Cardiovascular:  Distal perfusion appears intact  Musculoskeletal:  No edema appreciated, Good range of motion in all major joints.   Integument:  Warm, Dry, No erythema, No rash.   Neurologic:  Alert and oriented. No focal deficits noted.  Ambulatory   Psychiatric:  Affect normal, Judgment normal, Mood normal.      At the conclusion of the encounter I discussed the results of all of the tests and the disposition. The questions were answered. The patient or family acknowledged understanding and was agreeable with the care plan.     HPI  Jonas Rodriguez is a 81 year old female who reported to this ED via EMS for the evaluation of abnormal labs, weakness. Patient arrives from memory care for dementia. No complains on arrival. Cannot tell the year or month.    Currently awaiting discussion with neurosurgery regarding the chronics noted in her CT head scan.    LAB  Pertinent labs results reviewed   Results for orders placed or performed during the hospital encounter of 10/08/21   Head CT w/o contrast    Impression    IMPRESSION:  1.  Interval development of a chronic subdural hematoma or chronic  subdural hygroma along the left cerebral convexity measuring 9 mm maximum thickness resulting in 4-5 mm rightward midline shift of the septum pellucidum.  2.  No CT evidence for acute intracranial process.  3.  Brain atrophy and presumed chronic microvascular ischemic changes as above.   CT Abdomen Pelvis w Contrast    Impression    IMPRESSION:   1.  CT appearance of hepatic cirrhosis and portal hypertension with a large volume of ascites. Colon wall thickening is likely from hepatic dysfunction.    Comprehensive metabolic panel   Result Value Ref Range    Sodium 140 136 - 145 mmol/L    Potassium 4.7 3.5 - 5.0 mmol/L    Chloride 108 (H) 98 - 107 mmol/L    Carbon Dioxide (CO2) 26 22 - 31 mmol/L    Anion Gap 6 5 - 18 mmol/L    Urea Nitrogen 15 8 - 28 mg/dL    Creatinine 0.79 0.60 - 1.10 mg/dL    Calcium 8.4 (L) 8.5 - 10.5 mg/dL    Glucose 121 70 - 125 mg/dL    Alkaline Phosphatase 77 45 - 120 U/L    AST 51 (H) 0 - 40 U/L    ALT 22 0 - 45 U/L    Protein Total 6.9 6.0 - 8.0 g/dL    Albumin 2.3 (L) 3.5 - 5.0 g/dL    Bilirubin Total 0.6 0.0 - 1.0 mg/dL    GFR Estimate 71 >60 mL/min/1.73m2   Result Value Ref Range    Troponin I 0.01 0.00 - 0.29 ng/mL   UA with Microscopic reflex to Culture    Specimen: Urine, Ojeda Catheter   Result Value Ref Range    Color Urine Yellow Colorless, Straw, Light Yellow, Yellow    Appearance Urine Clear Clear    Glucose Urine Negative Negative mg/dL    Bilirubin Urine Negative Negative    Ketones Urine Negative Negative mg/dL    Specific Gravity Urine 1.025 1.001 - 1.030    Blood Urine Negative Negative    pH Urine 6.0 5.0 - 7.0    Protein Albumin Urine 10  (A) Negative mg/dL    Urobilinogen Urine 2.0 (A) <2.0 mg/dL    Nitrite Urine Positive (A) Negative    Leukocyte Esterase Urine Negative Negative    Bacteria Urine Few (A) None Seen /HPF    Mucus Urine Present (A) None Seen /LPF    Calcium Oxalate Crystals Urine Few (A) None Seen /HPF    RBC Urine 2 <=2 /HPF    WBC Urine 1 <=5 /HPF     Squamous Epithelials Urine 1 <=1 /HPF   Blood gas venous   Result Value Ref Range    pH Venous 7.37 7.35 - 7.45    pCO2 Venous 51 (H) 35 - 50 mm Hg    pO2 Venous 22 (L) 25 - 47 mm Hg    Bicarbonate Venous 27 24 - 30 mmol/L    Base Excess/Deficit (+/-) 3.7   mmol/L    Oxyhemoglobin Venous 28.1 (L) 70.0 - 75.0 %    O2 Sat, Venous 28.8 (L) 70.0 - 75.0 %   CBC with platelets and differential   Result Value Ref Range    WBC Count 3.9 (L) 4.0 - 11.0 10e3/uL    RBC Count 3.11 (L) 3.80 - 5.20 10e6/uL    Hemoglobin 6.8 (LL) 11.7 - 15.7 g/dL    Hematocrit 24.5 (L) 35.0 - 47.0 %    MCV 79 78 - 100 fL    MCH 21.9 (L) 26.5 - 33.0 pg    MCHC 27.8 (L) 31.5 - 36.5 g/dL    RDW 22.1 (H) 10.0 - 15.0 %    Platelet Count 104 (L) 150 - 450 10e3/uL    % Neutrophils 64 %    % Lymphocytes 20 %    % Monocytes 12 %    % Eosinophils 3 %    % Basophils 0 %    % Immature Granulocytes 1 %    NRBCs per 100 WBC 0 <1 /100    Absolute Neutrophils 2.6 1.6 - 8.3 10e3/uL    Absolute Lymphocytes 0.8 0.8 - 5.3 10e3/uL    Absolute Monocytes 0.5 0.0 - 1.3 10e3/uL    Absolute Eosinophils 0.1 0.0 - 0.7 10e3/uL    Absolute Basophils 0.0 0.0 - 0.2 10e3/uL    Absolute Immature Granulocytes 0.0 <=0.0 10e3/uL    Absolute NRBCs 0.0 10e3/uL   Asymptomatic COVID-19 Virus (Coronavirus) by PCR Nasopharyngeal    Specimen: Nasopharyngeal; Swab   Result Value Ref Range    SARS CoV2 PCR Negative Negative   ECG 12-LEAD WITH MUSE (LHE)   Result Value Ref Range    Systolic Blood Pressure 140 mmHg    Diastolic Blood Pressure 61 mmHg    Ventricular Rate 67 BPM    Atrial Rate 67 BPM    MN Interval 164 ms    QRS Duration 84 ms     ms    QTc 456 ms    P Axis  degrees    R AXIS 14 degrees    T Axis 74 degrees    Interpretation ECG       Sinus rhythm  Nonspecific ST and T wave abnormality  Abnormal ECG  When compared with ECG of 04-DEC-2003 07:16,  No significant change was found  Confirmed by SEE ED PROVIDER NOTE FOR, ECG INTERPRETATION ([id number]),  JULES ARREOLA ([id number])  on 10/8/2021 2:29:31 PM     Occult blood stool POCT   Result Value Ref Range    Occult Blood POCT Positive (A) Negative    Internal QC Check POCT Valid Valid    Test Card Lot Number [id number]     Test Card Expiration Date 2/23    Adult Type and Screen   Result Value Ref Range    ABO/RH(D) A POS     Antibody Screen Negative Negative    SPECIMEN EXPIRATION DATE [id number]    Prepare red blood cells (unit)   Result Value Ref Range    CROSSMATCH Compatible     UNIT ABO/RH A Pos     Unit Number [id number]     Unit Status Issued     Blood Component Type Red Blood Cells     Product Code [id number]     CODING SYSTEM [id number]     UNIT TYPE ISBT 6200     ISSUE DATE AND TIME [id number]          RADIOLOGY    Pertinent imaging reviewed   Please see official radiology report.  CT Abdomen Pelvis w Contrast   Final Result   IMPRESSION:    1.  CT appearance of hepatic cirrhosis and portal hypertension with a large volume of ascites. Colon wall thickening is likely from hepatic dysfunction.       Head CT w/o contrast   Final Result   IMPRESSION:   1.  Interval development of a chronic subdural hematoma or chronic subdural hygroma along the left cerebral convexity measuring 9 mm maximum thickness resulting in 4-5 mm rightward midline shift of the septum pellucidum.   2.  No CT evidence for acute intracranial process.   3.  Brain atrophy and presumed chronic microvascular ischemic changes as above.          FINAL IMPRESSION    1. Gastrointestinal hemorrhage, unspecified gastrointestinal hemorrhage type         DISCHARGE PRESCRIPTIONS  New Prescriptions    No medications on file       I, Sherif Odonnell, am serving as a scribe to document services personally performed by Ori Rogel MD, based on my observations and the provider's statements to me.  I, Ori Rogel MD, attest that Sherif Odonnell is acting in a scribe capacity, has observed my performance of the services and has documented them in accordance  with my direction.     Ori Rogel M.D.  Emergency Medicine  [facility] EMERGENCY ROOM     Ori Rogel MD  10/11/21 8755

## 2021-10-09 NOTE — PROGRESS NOTES
Neurosurgery Note:    Called to report 81 year old with frequent falls presents with generalized weakness found to have chronic appearing subdural hygroma measuring 9mm in size with 4-5mm of midline shift. Family reports patient hit her head 2 months ago or so. Unfortunately, neurosurgery would not have capacity to surgically intervene at [facility] should this subdural worsen. There are reportedly no neuro beds in the system. She currently is without neurologic complaint. Plan check coags and repeat head CT 0500, sooner if she develops deficit. At that time would need discussion with family vs transfer if acute component develops to this subdural. I can see patient tomorrow AM.     Addendum: increased density of subdurals on subsequent CT scan, cannot rule out possibility of hemorrhage. Recommend transfer to facility where she would be able to have neurosurgical intervention if needed.     Nayeli Wheeler CNP  [facility] Neurosurgery  O: [phone]  P: [phone]

## 2021-10-09 NOTE — ED NOTES
Bed placement status for neuro bed:  Per [facility] no bed; Per [facility] no bed; Per [facility] no bed; Per [facility] no bed, Per [facility] no bed, Per [place] no bed

## 2021-10-09 NOTE — ED PROVIDER NOTES
EMERGENCY DEPARTMENT ENCOUNTER      NAME: Jonas Rodriguez  AGE: 81 year old female  YOB: 1940  MRN: [id number]  EVALUATION DATE & TIME: 10/8/2021  1:28 PM    PCP: Mia Mcmahon    ED PROVIDER: Radha Guallpa M.D.        Chief Complaint   Patient presents with     Abnormal Labs     Generalized Weakness         FINAL IMPRESSION:    1. Subdural hemorrhage (H)    2. Gastrointestinal hemorrhage, unspecified gastrointestinal hemorrhage type    3. Urinary tract infection without hematuria, site unspecified    4. Acute deep vein thrombosis (DVT) of right lower extremity, unspecified vein (H)        Patient was signed out to me at change of shift by Dr. Rogel at 6:40 AM. Please see their note for full HPI, exam and plan.    MEDICAL DECISION MAKIN year old female with history of severe dementia who presents emergency department after unwitnessed fall and found to have incidental GI bleed with hemoglobin 6.8 status post blood transfusion, subdural hematomas, and DVT.  Unable to anticoagulate for DVT due to GI bleed and head bleed.  Patient's  does not want any intervention from a neurosurgical standpoint with these subdurals given her severe dementia and chronic morbidities would like an IVC filter to help prevent propagation of the DVT.  She has been accepted in transfer to the [facility] by Dr. Eng pending nursing assignment.  Patient otherwise hemodynamically stable.  At this time appears to be at her mental status baseline and nonfocal neuro exam otherwise.  Hospital UTI with nitrite positive and therefore receiving ceftriaxone.        ED COURSE:  6:40 AM  Patient was signed out to me at change of shift by Dr. Rogel. Please see their note for full HPI, exam and plan.    6:58 AM   I talked to Dr. Khan with radiology who recommended a repeat head CT in six hours. There are some changes but unclear if related to contrast or possible new blood. Will update  neurosurgery.    7:10 AM   I talked to Sharmila, NP Neurosurgery, regarding the patient. She recommends transfer to another facility with Neurosurgery abilities just in case. I will update the hospitalist team.    7:26 AM   I talked to Dr. Elizondo, hospitalist, regarding the patient. He would like me to transfer this patient for him.    9:14 AM  Charge RN has been looking for a bed for her head bleed (in system and outside system) without success. No beds in [facility] at this time. Unknown if could be traumatic, but no outside signs of trauma.    9:50 AM  Charge RN has look for a bed for this patient within the [facility], [facility], [facility], [facility], [facility], and [place] without success for finding a bed for her.    10:41 AM  Will talk to Neurosurgery at [facility] since these are subdurals and possible worsening on CT.    10:45 AM  I talked to Dr. Davey, [facility], regarding the patient.    11:48 AM  I called Zhang, patient's , with no answer. A voicemail was left.     11:51 AM  Zhang, patient's , called back. I discussed new imaging results with him and plan for care. He notes the patient's last unwitnessed fall was yesterday at [facility]. Discussed with him that admission at [facility] would not be ideal since the needed services are not available here. Discussed options for transfer for hospital admission and risks and benefits of Neurosurgery. Discussed risks and benefits from getting an IVC filter. He would like to hold off on any neurosurgery procedures, but would like to consider IVC filter.  He verbalized understanding and agreement with risks and benefits of both procedures. He understands that the patient will have to get transferred to another facility with IVC filter capabilities.     12:30 PM  I talked to Dr. Eng, [facility] Hospitalist, regarding the patient. She accepted the patient for transfer and hospital admission.  Bed available but pending nursing availability.  Patient otherwise hemodynamically stable at this time.    1:33 PM  Patient awaiting bed assignment and transport to [facility].  At this time patient's  does not want any neurosurgical intervention.  He is aware that these subdurals could worsen and even be fatal.  He would like to go over there is IR capability to place an IVC filter though as he understands the risks of the DVT and the fact that we cannot anticoagulate her at this time.      COVID-19 PPE worn during patient evaluation:  Mask: n95 and homemade masks   Eye Protection: goggles   Gown: none  Hair cover: yes  Face shield: none  Patient wearing a mask: no        At the conclusion of the encounter I discussed the results of all of the tests and the disposition. Their questions were answered. The patient (and any family present) acknowledged understanding and were agreeable with the care plan.    60 minutes of critical care time has been spent in direct patient care, laboratory review, review of previous medical records, and in discussion with admitting physician and consultant(s).  None of this time was spent in procedures.          CONSULTANTS:  Neurosurgery - RADHA Doll  [facility] Neurosurgery - Dr. Hood Davey.          MEDICATIONS GIVEN IN THE EMERGENCY:  Medications   hydrOXYzine (ATARAX) tablet 25 mg (25 mg Oral Given 10/9/21 0106)   prochlorperazine (COMPAZINE) injection 5 mg (5 mg Intravenous Given 10/9/21 0625)   lidocaine 1 % 0.1-1 mL (has no administration in time range)   lidocaine (LMX4) cream (has no administration in time range)   sodium chloride (PF) 0.9% PF flush 3 mL (3 mLs Intracatheter Given 10/9/21 0108)   sodium chloride (PF) 0.9% PF flush 3 mL (has no administration in time range)   sodium chloride 0.9% infusion (0 mLs Intravenous Paused 10/9/21 0650)   cefTRIAXone (ROCEPHIN) 1 g vial to attach to  mL bag for ADULTS or NS 50 mL bag for PEDS (has no  administration in time range)   pantoprazole (PROTONIX) IV push injection 40 mg (has no administration in time range)   pantoprazole (PROTONIX) IV push injection 40 mg (40 mg Intravenous Given 10/8/21 2010)   iopamidol (ISOVUE-370) solution 100 mL (100 mLs Intravenous Given 10/8/21 2044)   lactated ringers infusion (0 mLs Intravenous Stopped 10/9/21 0440)   cefTRIAXone (ROCEPHIN) 1 g vial to attach to  mL bag for ADULTS or NS 50 mL bag for PEDS (0 g Intravenous Stopped 10/9/21 0105)   0.9% sodium chloride BOLUS (250 mLs Intravenous New Bag 10/9/21 1042)             NEW PRESCRIPTIONS STARTED AT TODAY'S ER VISIT     Medication List      ASK your doctor about these medications    * nadolol 40 MG tablet  Commonly known as: CORGARD  Ask about: Which instructions should I use?     * nadolol 40 MG tablet  Commonly known as: CORGARD  Ask about: Which instructions should I use?     sertraline 100 MG tablet  Commonly known as: ZOLOFT  Ask about: Which instructions should I use?         * This list has 2 medication(s) that are the same as other medications prescribed for you. Read the directions carefully, and ask your doctor or other care provider to review them with you.                  CONDITION:  stable      DISPOSITION:  Transfer to the [facility] as accepted by Dr. Eng.          =================================================================  =================================================================      Patient was signed out to me at change of shift by Dr. Rogel at 6:40 AM. Please see their note for full HPI, exam and plan.        HPI:    Jonas Rodriguez is a 81 year old female with a pertinent history of cancer, diabetes mellitus, autoimmune disorder, hypercholestaraemia, and liver disorder, who presents to this ED via EMS for evaluation of abnormal labs and weakness.     Patient is coming from memory care. Patient has no complaints on arrival. She was found on work-up to have a  urinary tract infection and anemia.  Was initiated on a blood transfusion for symptomatic anemia and received antibiotics for urinary tract infection. Head CT scan demonstrated a chronic subdural hematoma with an associated shift.      CODE STATUS:  Partial code. Patient is DNI only.  confirms pt to get CPR if needed      PAST MEDICAL HISTORY:  Past Medical History:   Diagnosis Date     Anxiety      Autoimmune disorder (H)      Cancer (H)      Diabetes mellitus (H)      Elbow abrasion 10/6/2013     Fall from standing 10/6/2013     Gastro-oesophageal reflux disease      Hypercholesteraemia      Liver disorder      Vitamin deficiency          PAST SURGICAL HISTORY:  No past surgical history on file.      CURRENT MEDICATIONS:    Prior to Admission medications    Medication Sig Start Date End Date Taking? Authorizing Provider   donepezil (ARICEPT) 5 MG tablet Take 5 mg by mouth daily 10/22/20  Yes Unknown, Entered By History   nadolol (CORGARD) 40 MG tablet Take 60 mg by mouth every morning 5/7/21  Yes Unknown, Entered By History   nadolol (CORGARD) 40 MG tablet Take 40 mg by mouth every evening   Yes Unknown, Entered By History   omeprazole (PRILOSEC) 20 MG DR capsule Take 20 mg by mouth every morning (before breakfast) 7/25/21  Yes Unknown, Entered By History   sertraline (ZOLOFT) 100 MG tablet Take 150 mg by mouth daily 9/30/21  Yes Unknown, Entered By History   bacitracin ointment Apply topically 2 times daily 10/6/13   Sasha Case APRN CNP   Cholecalciferol (VITAMIN D3 PO) Take by mouth daily    Reported, Patient   ibuprofen (ADVIL/MOTRIN) 200 MG tablet Take 400 mg by mouth every 6 hours as needed for pain    Unknown, Entered By History   levETIRAcetam (KEPPRA) 250 MG tablet Take 1 tablet (250 mg) by mouth 2 times daily 10/6/13   Sasha Case APRN CNP   SIMVASTATIN PO     Reported, Patient         ALLERGIES:  Allergies   Allergen Reactions     Lisinopril Angioedema         FAMILY  HISTORY:  No family history on file.      SOCIAL HISTORY:  Social History     Socioeconomic History     Marital status:      Spouse name: Not on file     Number of children: Not on file     Years of education: Not on file     Highest education level: Not on file   Occupational History     Not on file   Tobacco Use     Smoking status: Not on file   Substance and Sexual Activity     Alcohol use: No     Drug use: No     Sexual activity: Not on file   Other Topics Concern     Not on file   Social History Narrative     Not on file     Social Determinants of Health     Financial Resource Strain:      Difficulty of Paying Living Expenses:    Food Insecurity:      Worried About Running Out of Food in the Last Year:      Ran Out of Food in the Last Year:    Transportation Needs:      Lack of Transportation (Medical):      Lack of Transportation (Non-Medical):    Physical Activity:      Days of Exercise per Week:      Minutes of Exercise per Session:    Stress:      Feeling of Stress :    Social Connections:      Frequency of Communication with Friends and Family:      Frequency of Social Gatherings with Friends and Family:      Attends [employer] Services:      Active Member of Clubs or Organizations:      Attends Club or Organization Meetings:      Marital Status:    Intimate Partner Violence:      Fear of Current or Ex-Partner:      Emotionally Abused:      Physically Abused:      Sexually Abused:          VITALS:  Patient Vitals for the past 24 hrs:   BP Temp Temp src Pulse Resp SpO2   10/09/21 1332 126/60 -- -- 58 -- --   10/09/21 1045 (!) 170/70 -- -- 59 22 97 %   10/09/21 0900 119/59 -- -- 60 27 96 %   10/09/21 0841 109/54 -- -- 52 -- --   10/09/21 0815 97/51 -- -- 53 13 96 %   10/09/21 0745 90/49 -- -- 54 15 --   10/09/21 0700 116/56 -- -- 57 18 --   10/09/21 0645 125/67 -- -- 66 27 --   10/09/21 0630 125/61 -- -- -- -- --   10/09/21 0615 (!) 150/65 -- -- 67 29 --   10/09/21 0600 138/62 -- -- 60 21 --   10/09/21  0545 126/58 -- -- 57 23 --   10/09/21 0530 137/63 -- -- 57 16 --   10/09/21 0515 131/61 -- -- 58 14 --   10/09/21 0500 137/62 -- -- -- -- --   10/09/21 0445 (!) 149/62 98.4  F (36.9  C) Axillary 63 22 92 %   10/09/21 0001 132/60 97.9  F (36.6  C) Oral 63 21 96 %   10/08/21 2300 -- -- -- 62 20 --   10/08/21 2245 -- -- -- 61 18 --   10/08/21 2230 -- -- -- 62 19 --   10/08/21 2215 -- -- -- 59 18 --   10/08/21 2200 -- -- -- 62 21 --   10/08/21 2130 -- -- -- 62 20 99 %   10/08/21 2115 -- -- -- 67 19 98 %   10/08/21 2030 (!) 148/66 -- -- 64 20 --   10/08/21 2015 (!) 150/63 -- -- 61 20 97 %   10/08/21 2000 (!) 152/65 -- -- 63 18 --   10/08/21 1900 (!) 143/65 98.4  F (36.9  C) Oral 62 17 97 %   10/08/21 1803 (!) 164/65 98.8  F (37.1  C) -- 64 18 --   10/08/21 1800 (!) 164/65 -- -- 64 23 98 %   10/08/21 1718 134/70 99.1  F (37.3  C) -- 63 18 --   10/08/21 1700 132/55 -- -- 63 20 --   10/08/21 1657 (!) 140/61 98.4  F (36.9  C) -- 63 16 --   10/08/21 1600 -- -- -- 64 20 --   10/08/21 1500 -- -- -- 64 19 98 %   10/08/21 1400 -- -- -- 66 -- 100 %       Wt Readings from Last 3 Encounters:   10/05/13 63.5 kg (140 lb)         PHYSICAL EXAM    Please see initial providers note for detailed physical exam        LAB:  All pertinent labs reviewed and interpreted.  Recent Results (from the past 24 hour(s))   ECG 12-LEAD WITH MUSE (LHE)    Collection Time: 10/08/21  2:08 PM   Result Value Ref Range    Systolic Blood Pressure 140 mmHg    Diastolic Blood Pressure 61 mmHg    Ventricular Rate 67 BPM    Atrial Rate 67 BPM    ME Interval 164 ms    QRS Duration 84 ms     ms    QTc 456 ms    P Axis  degrees    R AXIS 14 degrees    T Axis 74 degrees    Interpretation ECG       Sinus rhythm  Nonspecific ST and T wave abnormality  Abnormal ECG  When compared with ECG of 04-DEC-2003 07:16,  No significant change was found  Confirmed by SEE ED PROVIDER NOTE FOR, ECG INTERPRETATION ([id number]),  JULES ARREOLA ([id number]) on 10/8/2021 2:29:31 PM      Blood gas venous    Collection Time: 10/08/21  3:00 PM   Result Value Ref Range    pH Venous 7.37 7.35 - 7.45    pCO2 Venous 51 (H) 35 - 50 mm Hg    pO2 Venous 22 (L) 25 - 47 mm Hg    Bicarbonate Venous 27 24 - 30 mmol/L    Base Excess/Deficit (+/-) 3.7   mmol/L    Oxyhemoglobin Venous 28.1 (L) 70.0 - 75.0 %    O2 Sat, Venous 28.8 (L) 70.0 - 75.0 %   CBC with platelets and differential    Collection Time: 10/08/21  3:00 PM   Result Value Ref Range    WBC Count 3.9 (L) 4.0 - 11.0 10e3/uL    RBC Count 3.11 (L) 3.80 - 5.20 10e6/uL    Hemoglobin 6.8 (LL) 11.7 - 15.7 g/dL    Hematocrit 24.5 (L) 35.0 - 47.0 %    MCV 79 78 - 100 fL    MCH 21.9 (L) 26.5 - 33.0 pg    MCHC 27.8 (L) 31.5 - 36.5 g/dL    RDW 22.1 (H) 10.0 - 15.0 %    Platelet Count 104 (L) 150 - 450 10e3/uL    % Neutrophils 64 %    % Lymphocytes 20 %    % Monocytes 12 %    % Eosinophils 3 %    % Basophils 0 %    % Immature Granulocytes 1 %    NRBCs per 100 WBC 0 <1 /100    Absolute Neutrophils 2.6 1.6 - 8.3 10e3/uL    Absolute Lymphocytes 0.8 0.8 - 5.3 10e3/uL    Absolute Monocytes 0.5 0.0 - 1.3 10e3/uL    Absolute Eosinophils 0.1 0.0 - 0.7 10e3/uL    Absolute Basophils 0.0 0.0 - 0.2 10e3/uL    Absolute Immature Granulocytes 0.0 <=0.0 10e3/uL    Absolute NRBCs 0.0 10e3/uL   Adult Type and Screen    Collection Time: 10/08/21  3:00 PM   Result Value Ref Range    ABO/RH(D) A POS     Antibody Screen Negative Negative    SPECIMEN EXPIRATION DATE [id number]    Comprehensive metabolic panel    Collection Time: 10/08/21  3:07 PM   Result Value Ref Range    Sodium 140 136 - 145 mmol/L    Potassium 4.7 3.5 - 5.0 mmol/L    Chloride 108 (H) 98 - 107 mmol/L    Carbon Dioxide (CO2) 26 22 - 31 mmol/L    Anion Gap 6 5 - 18 mmol/L    Urea Nitrogen 15 8 - 28 mg/dL    Creatinine 0.79 0.60 - 1.10 mg/dL    Calcium 8.4 (L) 8.5 - 10.5 mg/dL    Glucose 121 70 - 125 mg/dL    Alkaline Phosphatase 77 45 - 120 U/L    AST 51 (H) 0 - 40 U/L    ALT 22 0 - 45 U/L    Protein Total 6.9 6.0  - 8.0 g/dL    Albumin 2.3 (L) 3.5 - 5.0 g/dL    Bilirubin Total 0.6 0.0 - 1.0 mg/dL    GFR Estimate 71 >60 mL/min/1.73m2   Troponin I    Collection Time: 10/08/21  3:07 PM   Result Value Ref Range    Troponin I 0.01 0.00 - 0.29 ng/mL   Blood Culture Peripheral Blood    Collection Time: 10/08/21  3:10 PM    Specimen: Peripheral Blood   Result Value Ref Range    Culture No growth after 12 hours    Blood Culture Peripheral Blood    Collection Time: 10/08/21  3:10 PM    Specimen: Peripheral Blood   Result Value Ref Range    Culture No growth after 12 hours    Prepare red blood cells (unit)    Collection Time: 10/08/21  3:30 PM   Result Value Ref Range    CROSSMATCH Compatible     UNIT ABO/RH A Pos     Unit Number [id number]     Unit Status Issued     Blood Component Type Red Blood Cells     Product Code [id number]     CODING SYSTEM [id number]     UNIT TYPE ISBT 6200     ISSUE DATE AND TIME [id number]    Occult blood stool POCT    Collection Time: 10/08/21  6:52 PM   Result Value Ref Range    Occult Blood POCT Positive (A) Negative    Internal QC Check POCT Valid Valid    Test Card Lot Number [id number]     Test Card Expiration Date 2/23 UA with Microscopic reflex to Culture    Collection Time: 10/08/21  8:01 PM    Specimen: Urine, Ojeda Catheter   Result Value Ref Range    Color Urine Yellow Colorless, Straw, Light Yellow, Yellow    Appearance Urine Clear Clear    Glucose Urine Negative Negative mg/dL    Bilirubin Urine Negative Negative    Ketones Urine Negative Negative mg/dL    Specific Gravity Urine 1.025 1.001 - 1.030    Blood Urine Negative Negative    pH Urine 6.0 5.0 - 7.0    Protein Albumin Urine 10  (A) Negative mg/dL    Urobilinogen Urine 2.0 (A) <2.0 mg/dL    Nitrite Urine Positive (A) Negative    Leukocyte Esterase Urine Negative Negative    Bacteria Urine Few (A) None Seen /HPF    Mucus Urine Present (A) None Seen /LPF    Calcium Oxalate Crystals Urine Few (A) None Seen /HPF    RBC Urine 2 <=2 /HPF    WBC  Urine 1 <=5 /HPF    Squamous Epithelials Urine 1 <=1 /HPF   Asymptomatic COVID-19 Virus (Coronavirus) by PCR Nasopharyngeal    Collection Time: 10/08/21  9:03 PM    Specimen: Nasopharyngeal; Swab   Result Value Ref Range    SARS CoV2 PCR Negative Negative   Partial thromboplastin time    Collection Time: 10/09/21  1:46 AM   Result Value Ref Range    aPTT 36 22 - 38 Seconds   INR    Collection Time: 10/09/21  1:46 AM   Result Value Ref Range    INR 1.52 (H) 0.85 - 1.15   CBC (+ platelets, no diff)    Collection Time: 10/09/21 10:40 AM   Result Value Ref Range    WBC Count 2.9 (L) 4.0 - 11.0 10e3/uL    RBC Count 3.61 (L) 3.80 - 5.20 10e6/uL    Hemoglobin 8.3 (L) 11.7 - 15.7 g/dL    Hematocrit 28.9 (L) 35.0 - 47.0 %    MCV 80 78 - 100 fL    MCH 23.0 (L) 26.5 - 33.0 pg    MCHC 28.7 (L) 31.5 - 36.5 g/dL    RDW 21.5 (H) 10.0 - 15.0 %    Platelet Count 89 (L) 150 - 450 10e3/uL   Basic metabolic panel    Collection Time: 10/09/21 10:40 AM   Result Value Ref Range    Sodium 139 136 - 145 mmol/L    Potassium 4.1 3.5 - 5.0 mmol/L    Chloride 107 98 - 107 mmol/L    Carbon Dioxide (CO2) 23 22 - 31 mmol/L    Anion Gap 9 5 - 18 mmol/L    Urea Nitrogen 13 8 - 28 mg/dL    Creatinine 0.75 0.60 - 1.10 mg/dL    Calcium 7.8 (L) 8.5 - 10.5 mg/dL    Glucose 86 70 - 125 mg/dL    GFR Estimate 76 >60 mL/min/1.73m2       Lab Results   Component Value Date    ABORH A POS 10/08/2021           RADIOLOGY:  Reviewed all pertinent imaging. Please see official radiology report.    Head CT w/o contrast   Final Result   IMPRESSION:   1.  Bilateral left larger than right subdural hematomas are unchanged compared with the examination from earlier today.   2.  There is probably a small amount of subdural blood along the interhemispheric fissure that is new or increased compared to the CT earlier today.      Head CT w/o contrast   Final Result   IMPRESSION:   1.  Increased density of the bilateral subdural collections, without significant change in volume  or mass effect. While findings may be related to recent contrast administration, interval acute hemorrhage cannot be entirely excluded. Recommend short    interval follow-up.      Findings were discussed with Dr. Guallpa at 6:55 AM on 10/09/2021.         US Lower Extremity Venous Duplex Bilateral   Final Result   IMPRESSION:   1.  Short segment of occlusive thrombus in the right gastrocnemius vein of the upper calf.   2.  No other deep venous thrombosis.      CT Abdomen Pelvis w Contrast   Final Result   IMPRESSION:    1.  CT appearance of hepatic cirrhosis and portal hypertension with a large volume of ascites. Colon wall thickening is likely from hepatic dysfunction.       Head CT w/o contrast   Final Result   IMPRESSION:   1.  Interval development of a chronic subdural hematoma or chronic subdural hygroma along the left cerebral convexity measuring 9 mm maximum thickness resulting in 4-5 mm rightward midline shift of the septum pellucidum.   2.  No CT evidence for acute intracranial process.   3.  Brain atrophy and presumed chronic microvascular ischemic changes as above.            EKG:    none      I, Elise Diaz, am serving as a scribe to document services personally performed by Dr. Radha Guallpa based on my observation and the provider's statements to me. I, Dr. Radha Guallpa MD attest that Elise Diaz is acting in a scribe capacity, has observed my performance of the services and has documented them in accordance with my direction.        Radha Guallpa M.D. [facility]  Emergency Medicine and Medical Toxicology  [facility] EMERGENCY ROOM  [address] [facility] [id number] [phone]  Dept: [phone]         Radha Guallpa MD  10/09/21 3978

## 2021-10-09 NOTE — PROGRESS NOTES
[facility]  Transfer Triage Note     Date of call: 10/9/2021   Time of call: 12:30pm     Is pandemic COVID-19 a concern? NO     Reason for transfer: Procedure can be done here and not at referring hospital   Diagnosis: Unwitnessed fall with subdural hemorrhage GI bleed, acute DVT and possible UTI     Outside Records: Available  Additional records requested to be faxed to [phone].     Stability of Patient: Patient is at risk for instability, however patient requires urgent transfer and does not meet ICU criteria   ICU: No     Expected Time of Arrival for Transfer: 0-8 hours     Arrival Location:  [address] [address] Phone: [phone]     81-year-old female with past medical history that includes advanced dementia who resides in a memory care unit initially brought to the hospital with complaint of generalized weakness, found to have subdural hemorrhage, GI bleeding and acute anemia with hemoglobin to 6.8, acute lower extremity DVT and possible urinary tract infection.  Patient reportedly had an unwitnessed fall at her memory care unit on 10/8, noted on chart review the patient has a history of difficulty with weakness and falls and was seen in the ED on 4/17/2021 for evaluation after a fall with a head injury. CT abdomen was completed and did not note additional source of bleeding but did find evidence of hepatic cirrhosis and portal hypertension with large volume ascites.  Serial CT head imaging demonstrating continued bilateral left larger than right subdural hematomas with most recent head imaging today demonstrating a possible small amount of subdural blood along the interhemispheric fissure that may be new or increased compared to prior CT image.  With regard to neurologic exam, patient is awake and able to follow commands but is not oriented and is not demonstrating any focal neurologic deficits on exam,  has been  bedside and notes with providers at transferring hospital that patient is at her baseline mental status.  Jaycee provider has talked with neurosurgery providers at [facility], see EM physician documentation for details.  Vitals have been stable, no acute findings on EKG or concerns for arrhythmia or acute changes on telemetry monitoring during her time in the ED. ED provider notes guaiac positive testing but no bloody stools or melena during her time in the emergency room. Pt transfused and most recent Hgb 8.3.     Patient's surrogate healthcare decision maker is her , EM physician participated in shared decision-making conversations with patient's  and reports that  would consider IVC filter placement for patient but wants to talk more about this, he would not want neurosurgical procedural interventions even if patient were to clinically worsen and intracranial bleeding became life-limiting, and he would consider GI procedure/endoscopic procedure for management of GI bleed if this were recommended.  Patient reportedly has a POLST designating CPR but no intubation.  There is no access to IVC filter placement at current hospital.      Patient accepted to [facility] bed no telemetry.  Recommend addressing CODE STATUS with patient's  at admission and completing a new POLST this admission. Consider Palliative Care consultation this admission for assistance with goals of care, support for family with decision-making and advance care planning.     Migdalia Eng MD  [phone]

## 2021-10-09 NOTE — PHARMACY-ADMISSION MEDICATION HISTORY
Pharmacy Note - Admission Medication History    Pertinent Provider Information: Received pt's MAR from [facility] ([phone]) and updated meds, strengths, and last doses from this document     ______________________________________________________________________    Prior To Admission (PTA) med list completed and updated in EMR.       PTA Med List   Medication Sig Last Dose     busPIRone (BUSPAR) 5 MG tablet Take 5 mg by mouth daily 10/8/2021     calcium carbonate-vitamin D 600-200 MG-UNIT TABS Take 1 tablet by mouth 2 times daily Crush and mix with food 10/8/2021     donepezil (ARICEPT) 10 MG tablet Take 10 mg by mouth daily  10/8/2021     ibuprofen (ADVIL/MOTRIN) 200 MG tablet Take 400 mg by mouth every 6 hours as needed for pain Crush and give with food Unknown at Unknown time     memantine (NAMENDA) 10 MG tablet Take 10 mg by mouth 2 times daily 10/8/2021     nadolol (CORGARD) 40 MG tablet Take 60 mg by mouth daily At 0800 10/8/2021     nadolol (CORGARD) 40 MG tablet Take 40 mg by mouth every evening At 1630 10/8/2021     omeprazole (PRILOSEC) 20 MG DR capsule Take 20 mg by mouth every morning (before breakfast) Open capsule and mix with food 10/8/2021     sertraline (ZOLOFT) 100 MG tablet Take 150 mg by mouth daily Crush and mix with food 10/8/2021     simvastatin (ZOCOR) 20 MG tablet Take 20 mg by mouth every evening At 1630. Crush and mix with food 10/7/2021 at 1630     vitamin D3 (VITAMIN D3) 50 mcg (2000 units) tablet Take 2,000 Units by mouth daily  10/8/2021       Information source(s): Facility ([facility]/) medication list/MAR and CareEverywhere/SureScripts  Method of interview communication: N/A    Summary of Changes to PTA Med List  New: buspirone, calcium carb + D3, memantine  Discontinued: bacitracin, keppra  Changed: vit D3, donepezil, simvastatin    Patient was asked about OTC/herbal products specifically.  PTA med list reflects this.    In the past week, patient estimated taking  medication this percent of the time:  greater than 90%.    Allergies were reviewed, assessed, and updated with the patient.      Patient does not use any multi-dose medications prior to admission.    The information provided in this note is only as accurate as the sources available at the time of the update(s).    Thank you for the opportunity to participate in the care of this patient.    Jessi Linder  10/9/2021 11:55 AM

## 2021-10-09 NOTE — ED NOTES
Zhang notified of patient's transfer to the [facility]. Due to patient's underlying dementia, pt's  consented for ambulance transfer to the . KRISTY Arias was updated on patient report on unit 6A. [facility] transport called. ETA [id number].

## 2021-10-09 NOTE — ED NOTES
Bed alarm placed. Room door opened.  leaving. Will do frequent rounds. Pt agitated and wanting to leave. Notified dr. montano for something to help calm patient

## 2021-10-09 NOTE — ED NOTES
Pt sleeping but awakes with verbal stimulation. Pt is able to follow commands. Pt denies pain. Blood was drawn and sent to lab. Pt's  left at 0815 for an appointment and will go home to get some rest before returning to the ED. Provider aware.

## 2021-10-09 NOTE — H&P
[facility] MEDICINE ADMISSION HISTORY AND PHYSICAL       Assessment & Plan      1. Anemia with Hgb of 6.8  S/p 1 unit PRBC while in ED -- She is pancytopenic. No obvious source of bleeding  - consider GI if no improvement with Hgb or if family would allow   - Stool OB  - Protonix  - repeat CBC     2. Has abdominal distension and CT showed large ascites - She has Albumin level of 1.3. No belly pain and no fevers. We can try diuresis and if it fails, consider paracentesis    Ascites could be from liver issues, since she has prior esophageal banding which is an evidence of portal HTN  Consider Lasix 10 mgs po daily. Abdomen seems not bothering her at this point     3. Has recurrent falls and weakness --- CT head showed Subdural hematoma  - She needs to be transferred as she may need to see Neurosurgery/neurology -- not sure if beds available     4. Leg swelling, bilateral -- check for DVT     5. UTI on rocephin       Her POLST - do CPR but no intubation       VTE prophylaxis: SCDs - if no DVT   IV fluids: Per order set   Diet:  NPO   GI prophylaxis: Not indicated at this point, re-assess if needed.   Pain, sleep, and vomiting medications: Per order set  Code Status: as above   COVID test result:  Pending   COVID vaccination: completed   Barriers to discharge: admitting clinical condition  Discharge Disposition and goals:  Unable to determine at this point, pending clinical progress and response to treatment. Patient may need transfer to SNF or ACR if unsafe to go home and needed treatment inappropriate at home setting OR may need home health care evaluation if care can be delivered at home settings. Consider referral to care manager/      I certify that inpatient services for greater than two midnights are medically necessary based on presenting medical condition/s listed in the H&P and care plan.   Care plan was created based on available information provided, including  patient's condition at the time of encounter.   This plan was discussed with patient and/or family members using layman's terms and have agreed to proceed.   At the end of night shift (9PM - 730A), this case will be presented to the AM Hospitalist.    It is recommended to revise care plan if there is change in condition and/or new clinical information that are not available during my encounter.     All or some of home medication/s were not resumed on admission due to safety reasons or contraindications. Dosing and frequency may also have been modified. Please resume/review them during your visit.     70 minutes of total visit duration and greater than 50% was spent in direct evaluation of patient and coordination of care including discussion of diagnostic test results and recommended treatment. .      Nathan Andrade MD, MPH, FACP, [facility], [facility]  Internal Medicine - Hospitalist        Chief Complaint Weakness      HISTORY     - Met her in the ED. Met her  too. Patient is from memory care unit. She was awake and interactive. She was not agitated or restless.  - She came in with generalized weakness and  reported she has been falling a lot. No reported injury. Denies fall today.  -  reported he got called for weakness, for low Hgb and also for abdominal distension. No blood stools or diarrhea.  - He also reported that patient has liver issues and had esophageal banding in the past.   - In the ED, head CT showed looks like chronic subdural hematoma. Abdominal CT showed - large ascites. No fever or pains  - She was give PRBC for Low Hgb of 6.8 - She is pancytopenic.   - ROS --- Limited due to memory issues. However, denies headache. When I asked her to raise her arms and legs - she was able to do this.        Past Medical History     Past Medical History:   Diagnosis Date     Anxiety      Autoimmune disorder (H)      Cancer (H)      Diabetes mellitus (H)      Elbow abrasion 10/6/2013     Fall from standing  10/6/2013     Gastro-oesophageal reflux disease      Hypercholesteraemia      Liver disorder      Vitamin deficiency          Surgical History     Esophageal banding     Family History      Unable to tell     Social History      .  Social History     Socioeconomic History     Marital status:      Spouse name: Not on file     Number of children: Not on file     Years of education: Not on file     Highest education level: Not on file   Occupational History     Not on file   Tobacco Use     Smoking status: Not on file   Substance and Sexual Activity     Alcohol use: No     Drug use: No     Sexual activity: Not on file   Other Topics Concern     Not on file   Social History Narrative     Not on file     Social Determinants of Health     Financial Resource Strain:      Difficulty of Paying Living Expenses:    Food Insecurity:      Worried About Running Out of Food in the Last Year:      Ran Out of Food in the Last Year:    Transportation Needs:      Lack of Transportation (Medical):      Lack of Transportation (Non-Medical):    Physical Activity:      Days of Exercise per Week:      Minutes of Exercise per Session:    Stress:      Feeling of Stress :    Social Connections:      Frequency of Communication with Friends and Family:      Frequency of Social Gatherings with Friends and Family:      Attends Faith Services:      Active Member of Clubs or Organizations:      Attends Club or Organization Meetings:      Marital Status:    Intimate Partner Violence:      Fear of Current or Ex-Partner:      Emotionally Abused:      Physically Abused:      Sexually Abused:           Allergies        Allergies   Allergen Reactions     Lisinopril Angioedema         Prior to Admission Medications      No current facility-administered medications on file prior to encounter.  donepezil (ARICEPT) 5 MG tablet, Take 5 mg by mouth daily  nadolol (CORGARD) 40 MG tablet, Take 60 mg by mouth every morning  nadolol (CORGARD) 40 MG  tablet, Take 40 mg by mouth every evening  omeprazole (PRILOSEC) 20 MG DR capsule, Take 20 mg by mouth every morning (before breakfast)  sertraline (ZOLOFT) 100 MG tablet, Take 150 mg by mouth daily  bacitracin ointment, Apply topically 2 times daily  Cholecalciferol (VITAMIN D3 PO), Take by mouth daily  ibuprofen (ADVIL/MOTRIN) 200 MG tablet, Take 400 mg by mouth every 6 hours as needed for pain  levETIRAcetam (KEPPRA) 250 MG tablet, Take 1 tablet (250 mg) by mouth 2 times daily  SIMVASTATIN PO,             Review of Systems     A 12 point comprehensive review of systems was negative except as noted above in HPI.    PHYSICAL EXAMINATION       Vitals      Vitals: BP (!) 152/65   Pulse 63   Temp 98.4  F (36.9  C) (Oral)   Resp 18   SpO2 97%   BMI= There is no height or weight on file to calculate BMI.      Examination     General Appearance:  Alert, cooperative, no distress  Head:    Normocephalic, without obvious abnormality, atraumatic  EENT:  PERRL, conjunctiva/corneas clear, EOM's intact.   Neck:   Supple, symmetrical, trachea midline, no adenopathy; no NVE  Back:  Symmetric, no curvature, no CVA tenderness  Chest/Lungs: Clear to auscultation bilaterally, respirations unlabored, No tenderness or deformity. No abdominal breathing or use of accessory muscles.   Heart:    Regular rate and rhythm, S1 and S2 normal, no murmur, rub   or gallop  Abdomen: Soft, non-tender, bowel sounds active all four quadrants, not peritoneal on palpation. (+) distended   Extremities:  Mild leg swelling   Skin:  Skin color, texture, turgor normal, no rashes or lesion  Neurologic:  Awake and following commands, Pupils OK, EOMS are OK, no facial droop. She can raise both UE and LE       Pertinent Lab     Results for orders placed or performed during the hospital encounter of 10/08/21   Comprehensive metabolic panel   Result Value Ref Range    Sodium 140 136 - 145 mmol/L    Potassium 4.7 3.5 - 5.0 mmol/L    Chloride 108 (H) 98 - 107  mmol/L    Carbon Dioxide (CO2) 26 22 - 31 mmol/L    Anion Gap 6 5 - 18 mmol/L    Urea Nitrogen 15 8 - 28 mg/dL    Creatinine 0.79 0.60 - 1.10 mg/dL    Calcium 8.4 (L) 8.5 - 10.5 mg/dL    Glucose 121 70 - 125 mg/dL    Alkaline Phosphatase 77 45 - 120 U/L    AST 51 (H) 0 - 40 U/L    ALT 22 0 - 45 U/L    Protein Total 6.9 6.0 - 8.0 g/dL    Albumin 2.3 (L) 3.5 - 5.0 g/dL    Bilirubin Total 0.6 0.0 - 1.0 mg/dL    GFR Estimate 71 >60 mL/min/1.73m2   Result Value Ref Range    Troponin I 0.01 0.00 - 0.29 ng/mL   UA with Microscopic reflex to Culture    Specimen: Urine, Ojeda Catheter   Result Value Ref Range    Color Urine Yellow Colorless, Straw, Light Yellow, Yellow    Appearance Urine Clear Clear    Glucose Urine Negative Negative mg/dL    Bilirubin Urine Negative Negative    Ketones Urine Negative Negative mg/dL    Specific Gravity Urine 1.025 1.001 - 1.030    Blood Urine Negative Negative    pH Urine 6.0 5.0 - 7.0    Protein Albumin Urine 10  (A) Negative mg/dL    Urobilinogen Urine 2.0 (A) <2.0 mg/dL    Nitrite Urine Positive (A) Negative    Leukocyte Esterase Urine Negative Negative    Bacteria Urine Few (A) None Seen /HPF    Mucus Urine Present (A) None Seen /LPF    Calcium Oxalate Crystals Urine Few (A) None Seen /HPF    RBC Urine 2 <=2 /HPF    WBC Urine 1 <=5 /HPF    Squamous Epithelials Urine 1 <=1 /HPF   Blood gas venous   Result Value Ref Range    pH Venous 7.37 7.35 - 7.45    pCO2 Venous 51 (H) 35 - 50 mm Hg    pO2 Venous 22 (L) 25 - 47 mm Hg    Bicarbonate Venous 27 24 - 30 mmol/L    Base Excess/Deficit (+/-) 3.7   mmol/L    Oxyhemoglobin Venous 28.1 (L) 70.0 - 75.0 %    O2 Sat, Venous 28.8 (L) 70.0 - 75.0 %   CBC with platelets and differential   Result Value Ref Range    WBC Count 3.9 (L) 4.0 - 11.0 10e3/uL    RBC Count 3.11 (L) 3.80 - 5.20 10e6/uL    Hemoglobin 6.8 (LL) 11.7 - 15.7 g/dL    Hematocrit 24.5 (L) 35.0 - 47.0 %    MCV 79 78 - 100 fL    MCH 21.9 (L) 26.5 - 33.0 pg    MCHC 27.8 (L) 31.5 - 36.5  g/dL    RDW 22.1 (H) 10.0 - 15.0 %    Platelet Count 104 (L) 150 - 450 10e3/uL    % Neutrophils 64 %    % Lymphocytes 20 %    % Monocytes 12 %    % Eosinophils 3 %    % Basophils 0 %    % Immature Granulocytes 1 %    NRBCs per 100 WBC 0 <1 /100    Absolute Neutrophils 2.6 1.6 - 8.3 10e3/uL    Absolute Lymphocytes 0.8 0.8 - 5.3 10e3/uL    Absolute Monocytes 0.5 0.0 - 1.3 10e3/uL    Absolute Eosinophils 0.1 0.0 - 0.7 10e3/uL    Absolute Basophils 0.0 0.0 - 0.2 10e3/uL    Absolute Immature Granulocytes 0.0 <=0.0 10e3/uL    Absolute NRBCs 0.0 10e3/uL   ECG 12-LEAD WITH MUSE (LHE)   Result Value Ref Range    Systolic Blood Pressure 140 mmHg    Diastolic Blood Pressure 61 mmHg    Ventricular Rate 67 BPM    Atrial Rate 67 BPM    RI Interval 164 ms    QRS Duration 84 ms     ms    QTc 456 ms    P Axis  degrees    R AXIS 14 degrees    T Axis 74 degrees    Interpretation ECG       Sinus rhythm  Nonspecific ST and T wave abnormality  Abnormal ECG  When compared with ECG of 04-DEC-2003 07:16,  No significant change was found  Confirmed by SEE ED PROVIDER NOTE FOR, ECG INTERPRETATION (4000),  JULES ARREOLA ([id number]) on 10/8/2021 2:29:31 PM     Occult blood stool POCT   Result Value Ref Range    Occult Blood POCT Positive (A) Negative    Internal QC Check POCT Valid Valid    Test Card Lot Number [id number]     Test Card Expiration Date 2/23    Adult Type and Screen   Result Value Ref Range    ABO/RH(D) A POS     Antibody Screen Negative Negative    SPECIMEN EXPIRATION DATE [id number]    Prepare red blood cells (unit)   Result Value Ref Range    CROSSMATCH Compatible     UNIT ABO/RH A Pos     Unit Number [id number]     Unit Status Issued     Blood Component Type Red Blood Cells     Product Code [id number]     CODING SYSTEM EXLW149     UNIT TYPE ISBT 6200     ISSUE DATE AND TIME [id number]            Pertinent Radiology

## 2021-10-10 ENCOUNTER — APPOINTMENT (OUTPATIENT)
Dept: PHYSICAL THERAPY | Facility: CLINIC | Age: 81
DRG: 064 | End: 2021-10-10
Attending: PHYSICIAN ASSISTANT
Payer: COMMERCIAL

## 2021-10-10 LAB
ALBUMIN FLD-MCNC: 0.4 G/DL
ANION GAP SERPL CALCULATED.3IONS-SCNC: 4 MMOL/L (ref 3–14)
APPEARANCE FLD: CLEAR
BASOPHILS # BLD AUTO: 0 10E3/UL (ref 0–0.2)
BASOPHILS NFR BLD AUTO: 0 %
BUN SERPL-MCNC: 19 MG/DL (ref 7–30)
CALCIUM SERPL-MCNC: 7.7 MG/DL (ref 8.5–10.1)
CHLORIDE BLD-SCNC: 113 MMOL/L (ref 94–109)
CO2 SERPL-SCNC: 26 MMOL/L (ref 20–32)
COLOR FLD: YELLOW
CREAT SERPL-MCNC: 0.83 MG/DL (ref 0.52–1.04)
EOSINOPHIL # BLD AUTO: 0.1 10E3/UL (ref 0–0.7)
EOSINOPHIL NFR BLD AUTO: 3 %
ERYTHROCYTE [DISTWIDTH] IN BLOOD BY AUTOMATED COUNT: 22.1 % (ref 10–15)
GFR SERPL CREATININE-BSD FRML MDRD: 67 ML/MIN/1.73M2
GLUCOSE BLD-MCNC: 63 MG/DL (ref 70–99)
GLUCOSE BLDC GLUCOMTR-MCNC: 151 MG/DL (ref 70–99)
GLUCOSE BLDC GLUCOMTR-MCNC: 161 MG/DL (ref 70–99)
GLUCOSE BLDC GLUCOMTR-MCNC: 62 MG/DL (ref 70–99)
GLUCOSE BLDC GLUCOMTR-MCNC: 70 MG/DL (ref 70–99)
GRAM STAIN RESULT: NORMAL
GRAM STAIN RESULT: NORMAL
HCT VFR BLD AUTO: 25.7 % (ref 35–47)
HGB BLD-MCNC: 7.4 G/DL (ref 11.7–15.7)
HGB BLD-MCNC: 8 G/DL (ref 11.7–15.7)
IMM GRANULOCYTES # BLD: 0 10E3/UL
IMM GRANULOCYTES NFR BLD: 0 %
LYMPHOCYTES # BLD AUTO: 0.7 10E3/UL (ref 0.8–5.3)
LYMPHOCYTES NFR BLD AUTO: 25 %
LYMPHOCYTES NFR FLD MANUAL: 26 %
MCH RBC QN AUTO: 22.9 PG (ref 26.5–33)
MCHC RBC AUTO-ENTMCNC: 28.8 G/DL (ref 31.5–36.5)
MCV RBC AUTO: 80 FL (ref 78–100)
MONOCYTES # BLD AUTO: 0.3 10E3/UL (ref 0–1.3)
MONOCYTES NFR BLD AUTO: 12 %
MONOS+MACROS NFR FLD MANUAL: NORMAL %
NEUTROPHILS # BLD AUTO: 1.7 10E3/UL (ref 1.6–8.3)
NEUTROPHILS NFR BLD AUTO: 60 %
NEUTS BAND NFR FLD MANUAL: 4 %
NRBC # BLD AUTO: 0 10E3/UL
NRBC BLD AUTO-RTO: 0 /100
OTHER CELLS FLD MANUAL: 70 %
PLATELET # BLD AUTO: 90 10E3/UL (ref 150–450)
POTASSIUM BLD-SCNC: 3.8 MMOL/L (ref 3.4–5.3)
RBC # BLD AUTO: 3.23 10E6/UL (ref 3.8–5.2)
SODIUM SERPL-SCNC: 143 MMOL/L (ref 133–144)
WBC # BLD AUTO: 2.9 10E3/UL (ref 4–11)
WBC # FLD AUTO: 48 /UL

## 2021-10-10 PROCEDURE — 0W9G3ZZ DRAINAGE OF PERITONEAL CAVITY, PERCUTANEOUS APPROACH: ICD-10-PCS | Performed by: INTERNAL MEDICINE

## 2021-10-10 PROCEDURE — 36415 COLL VENOUS BLD VENIPUNCTURE: CPT | Performed by: INTERNAL MEDICINE

## 2021-10-10 PROCEDURE — 97162 PT EVAL MOD COMPLEX 30 MIN: CPT | Mod: GP

## 2021-10-10 PROCEDURE — 250N000013 HC RX MED GY IP 250 OP 250 PS 637: Performed by: PHYSICIAN ASSISTANT

## 2021-10-10 PROCEDURE — 82042 OTHER SOURCE ALBUMIN QUAN EA: CPT | Performed by: INTERNAL MEDICINE

## 2021-10-10 PROCEDURE — 87205 SMEAR GRAM STAIN: CPT | Performed by: INTERNAL MEDICINE

## 2021-10-10 PROCEDURE — 250N000013 HC RX MED GY IP 250 OP 250 PS 637: Performed by: STUDENT IN AN ORGANIZED HEALTH CARE EDUCATION/TRAINING PROGRAM

## 2021-10-10 PROCEDURE — C9113 INJ PANTOPRAZOLE SODIUM, VIA: HCPCS | Performed by: PHYSICIAN ASSISTANT

## 2021-10-10 PROCEDURE — 99233 SBSQ HOSP IP/OBS HIGH 50: CPT | Performed by: INTERNAL MEDICINE

## 2021-10-10 PROCEDURE — 250N000011 HC RX IP 250 OP 636: Performed by: PHYSICIAN ASSISTANT

## 2021-10-10 PROCEDURE — 36415 COLL VENOUS BLD VENIPUNCTURE: CPT | Performed by: PHYSICIAN ASSISTANT

## 2021-10-10 PROCEDURE — 258N000001 HC RX 258: Performed by: HOSPITALIST

## 2021-10-10 PROCEDURE — 97530 THERAPEUTIC ACTIVITIES: CPT | Mod: GP

## 2021-10-10 PROCEDURE — 250N000011 HC RX IP 250 OP 636: Performed by: INTERNAL MEDICINE

## 2021-10-10 PROCEDURE — 85025 COMPLETE CBC W/AUTO DIFF WBC: CPT | Performed by: PHYSICIAN ASSISTANT

## 2021-10-10 PROCEDURE — 87070 CULTURE OTHR SPECIMN AEROBIC: CPT | Performed by: INTERNAL MEDICINE

## 2021-10-10 PROCEDURE — 89051 BODY FLUID CELL COUNT: CPT | Performed by: INTERNAL MEDICINE

## 2021-10-10 PROCEDURE — P9047 ALBUMIN (HUMAN), 25%, 50ML: HCPCS | Performed by: INTERNAL MEDICINE

## 2021-10-10 PROCEDURE — 49083 ABD PARACENTESIS W/IMAGING: CPT | Performed by: INTERNAL MEDICINE

## 2021-10-10 PROCEDURE — 99221 1ST HOSP IP/OBS SF/LOW 40: CPT | Mod: GC | Performed by: INTERNAL MEDICINE

## 2021-10-10 PROCEDURE — 85018 HEMOGLOBIN: CPT | Performed by: INTERNAL MEDICINE

## 2021-10-10 PROCEDURE — 80048 BASIC METABOLIC PNL TOTAL CA: CPT | Performed by: PHYSICIAN ASSISTANT

## 2021-10-10 PROCEDURE — 120N000002 HC R&B MED SURG/OB UMMC

## 2021-10-10 RX ORDER — ALBUMIN (HUMAN) 12.5 G/50ML
25 SOLUTION INTRAVENOUS ONCE
Status: COMPLETED | OUTPATIENT
Start: 2021-10-10 | End: 2021-10-10

## 2021-10-10 RX ORDER — NICOTINE POLACRILEX 4 MG
15-30 LOZENGE BUCCAL
Status: DISCONTINUED | OUTPATIENT
Start: 2021-10-10 | End: 2021-10-12 | Stop reason: HOSPADM

## 2021-10-10 RX ORDER — DEXTROSE MONOHYDRATE 25 G/50ML
25-50 INJECTION, SOLUTION INTRAVENOUS
Status: DISCONTINUED | OUTPATIENT
Start: 2021-10-10 | End: 2021-10-12 | Stop reason: HOSPADM

## 2021-10-10 RX ADMIN — NADOLOL 60 MG: 20 TABLET ORAL at 08:46

## 2021-10-10 RX ADMIN — SERTRALINE HYDROCHLORIDE 150 MG: 100 TABLET ORAL at 08:46

## 2021-10-10 RX ADMIN — CALCIUM CARBONATE 600 MG (1,500 MG)-VITAMIN D3 400 UNIT TABLET 0.5 TABLET: at 20:37

## 2021-10-10 RX ADMIN — PANTOPRAZOLE SODIUM 40 MG: 40 INJECTION, POWDER, FOR SOLUTION INTRAVENOUS at 08:44

## 2021-10-10 RX ADMIN — SIMVASTATIN 20 MG: 20 TABLET, FILM COATED ORAL at 20:37

## 2021-10-10 RX ADMIN — MEMANTINE 10 MG: 10 TABLET ORAL at 20:37

## 2021-10-10 RX ADMIN — ALBUMIN HUMAN 25 G: 0.25 SOLUTION INTRAVENOUS at 16:31

## 2021-10-10 RX ADMIN — DONEPEZIL HYDROCHLORIDE 10 MG: 10 TABLET, FILM COATED ORAL at 08:46

## 2021-10-10 RX ADMIN — NADOLOL 40 MG: 40 TABLET ORAL at 20:35

## 2021-10-10 RX ADMIN — DEXTROSE MONOHYDRATE 25 ML: 500 INJECTION PARENTERAL at 07:06

## 2021-10-10 RX ADMIN — CALCIUM CARBONATE 600 MG (1,500 MG)-VITAMIN D3 400 UNIT TABLET 1 TABLET: at 08:46

## 2021-10-10 RX ADMIN — PANTOPRAZOLE SODIUM 40 MG: 40 INJECTION, POWDER, FOR SOLUTION INTRAVENOUS at 20:37

## 2021-10-10 RX ADMIN — BUSPIRONE HYDROCHLORIDE 5 MG: 5 TABLET ORAL at 08:46

## 2021-10-10 RX ADMIN — Medication 50 MCG: at 08:46

## 2021-10-10 RX ADMIN — MEMANTINE 10 MG: 10 TABLET ORAL at 08:46

## 2021-10-10 RX ADMIN — CEFTRIAXONE SODIUM 1 G: 1 INJECTION, POWDER, FOR SOLUTION INTRAMUSCULAR; INTRAVENOUS at 04:38

## 2021-10-10 ASSESSMENT — ACTIVITIES OF DAILY LIVING (ADL)
ADLS_ACUITY_SCORE: 24

## 2021-10-10 ASSESSMENT — VISUAL ACUITY: OU: BASELINE;GLASSES

## 2021-10-10 NOTE — PROVIDER NOTIFICATION
MD notified of pt. AM BG lab of 63, recheck on floor was 62. No PRN orders available at this time and pt. Is NPO status.    **BG orders placed, awaiting verification from pharmacy for D50 then will admin   PHYSICAL EXAM:    Constitutional: NAD, awake and alert, well-developed  HEENT: PERR, EOMI, Normal Hearing, MMM  Neck: Soft and supple, No LAD, No JVD  Respiratory: Breath sounds are clear bilaterally, No wheezing, rales or rhonchi  Cardiovascular: S1 and S2, regular rate and rhythm, 2/4 systolic Murmurs, gallops or rubs  Gastrointestinal: Bowel Sounds present, soft, nontender, nondistended, no guarding, no rebound  Extremities: No peripheral edema  Vascular: 2+ peripheral pulses  Neurological: A/O x 3, no focal deficits  Musculoskeletal: 5/5 strength b/l upper and lower extremities  Skin: No rashes  rectal : deferred, not indicated

## 2021-10-10 NOTE — PLAN OF CARE
SLP: Swallow evaluation order received. Pt is on a regular diet, RN noted pt having difficulty swallowing pills this morning. Pt working with other providers when attempted to see her this afternoon. Recommend crushing pills and placing in puree as needed.     Will reschedule for complete swallow evaluation on 10/11.

## 2021-10-10 NOTE — CONSULTS
[facility]       NEUROSURGERY CONSULTATION NOTE    This consultation was requested by Dr. Ross from the Medicine service.    Reason for Consultation: SDH    HPI: Jonas Rodriguez is a 81 year old female with a PMH of liver cirrhosis w/ portal hypertension, dementia, currently living in a Memory Care center. Per her , she has been falling frequently as her dementia has progressed. He took her to the ED at [facility] today after her noticed she appeared weaker and pale. She is not currently on any anticoagulants or antiplatelets. She currently denies any headaches, nausea/vomiting or blurry vision.       PAST MEDICAL HISTORY:   Past Medical History:   Diagnosis Date    Anxiety     Autoimmune disorder (H)     Cancer (H)     Diabetes mellitus (H)     Elbow abrasion 10/6/2013    Fall from standing 10/6/2013    Gastro-oesophageal reflux disease     Hypercholesteraemia     Liver disorder     Vitamin deficiency        PAST SURGICAL HISTORY: No past surgical history on file.    FAMILY HISTORY: No family history on file.    SOCIAL HISTORY:   Social History     Tobacco Use    Smoking status: Not on file   Substance Use Topics    Alcohol use: No       MEDICATIONS:  Medications Prior to Admission   Medication Sig Dispense Refill Last Dose    bacitracin ointment Apply topically 2 times daily (Patient not taking: Reported on 10/9/2021)       busPIRone (BUSPAR) 5 MG tablet Take 5 mg by mouth daily       calcium carbonate-vitamin D 600-200 MG-UNIT TABS Take 1 tablet by mouth 2 times daily Crush and mix with food       donepezil (ARICEPT) 10 MG tablet Take 10 mg by mouth daily        ibuprofen (ADVIL/MOTRIN) 200 MG tablet Take 400 mg by mouth every 6 hours as needed for pain Crush and give with food       memantine (NAMENDA) 10 MG tablet Take 10 mg by mouth 2 times daily       nadolol (CORGARD) 40 MG tablet Take 60 mg by mouth daily At 0800       nadolol (CORGARD) 40 MG tablet  Take 40 mg by mouth every evening At 1630       omeprazole (PRILOSEC) 20 MG DR capsule Take 20 mg by mouth every morning (before breakfast) Open capsule and mix with food       sertraline (ZOLOFT) 100 MG tablet Take 150 mg by mouth daily Crush and mix with food       simvastatin (ZOCOR) 20 MG tablet Take 20 mg by mouth every evening At 1630. Crush and mix with food       vitamin D3 (VITAMIN D3) 50 mcg (2000 units) tablet Take 2,000 Units by mouth daily           Allergies:  Allergies   Allergen Reactions    Lisinopril Angioedema       ROS: Unable to obtain given patient's poor recall    Physical exam:   Blood pressure 131/45, pulse 61, temperature 98.8  F (37.1  C), resp. rate 16, weight 59.7 kg (131 lb 9.8 oz), SpO2 95 %.  CV: BP and HR as noted above  PULM: breathing comfortably on room air  ABD: distended, depressible, non-tender  NEUROLOGIC:  -- Awake; Alert; oriented x 2  -- Follows commands briskly  -- +repetition and naming  -- Speech fluent, spontaneous. No aphasia or dysarthria.  -- no gaze preference. No apparent hemineglect.  Cranial Nerves:  -- visual fields full to confrontation, PERRL 3-2mm bilat and brisk, extraocular movements intact  -- face symmetrical, tongue midline  -- sensory V1-V3 intact bilaterally  -- palate elevates symmetrically, uvula midline  -- hearing diminished  -- Trapezii 5/5 strength bilat symmetric  -- Cerebellar: Finger nose finger without dysmetria, intact rapid alternating motions bilaterally    Motor:  Decreased bulk; no tremor, rigidity, or bradykinesia.  No muscle wasting or fasciculations  No Pronator Drift     Delt Bi Tri Hand Flexion/  Extension Iliopsoas Quadriceps Hamstrings Tibialis Anterior Gastroc    C5 C6 C7 C8/T1 L2 L3 L4-S1 L4 S1   R 4+ 4+ 4+ 4+ 4+ 4+ 4+ 4+ 4+   L 4+ 4+ 4+ 4+ 4+ 4+ 4+ 4+ 4+     Sensory:  intact to LT x 4 extremities     Reflexes:     Bi Tri BR Inderjit Pat Ach Bab    C5-6 C7-8 C6 UMN L2-4 S1 UMN   R 2+ 2+ 2+ Norm 2+ 2+ Norm   L 2+ 2+ 2+ Norm 2+ 2+  Norm     Gait: Deferred      LABS:  Last Comprehensive Metabolic Panel:  Sodium   Date Value Ref Range Status   10/09/2021 139 136 - 145 mmol/L Final   10/06/2013 141 133 - 144 mmol/L Final     Potassium   Date Value Ref Range Status   10/09/2021 4.1 3.5 - 5.0 mmol/L Final   10/06/2013 3.9 3.4 - 5.3 mmol/L Final     Chloride   Date Value Ref Range Status   10/09/2021 107 98 - 107 mmol/L Final   10/06/2013 107 94 - 109 mmol/L Final     Carbon Dioxide   Date Value Ref Range Status   10/06/2013 27 20 - 32 mmol/L Final     Carbon Dioxide (CO2)   Date Value Ref Range Status   10/09/2021 23 22 - 31 mmol/L Final     Anion Gap   Date Value Ref Range Status   10/09/2021 9 5 - 18 mmol/L Final   10/06/2013 7 6 - 17 mmol/L Final     Glucose   Date Value Ref Range Status   10/09/2021 86 70 - 125 mg/dL Final   10/06/2013 106 (H) 60 - 99 mg/dL Final     Urea Nitrogen   Date Value Ref Range Status   10/09/2021 13 8 - 28 mg/dL Final   10/06/2013 13 7 - 30 mg/dL Final     Creatinine   Date Value Ref Range Status   10/09/2021 0.75 0.60 - 1.10 mg/dL Final   10/06/2013 0.87 0.52 - 1.04 mg/dL Final     GFR Estimate   Date Value Ref Range Status   10/09/2021 76 >60 mL/min/1.73m2 Final     Comment:     As of July 11, 2021, eGFR is calculated by the CKD-EPI creatinine equation, without race adjustment. eGFR can be influenced by muscle mass, exercise, and diet. The reported eGFR is an estimation only and is only applicable if the renal function is stable.   10/06/2013 64 >60 mL/min/1.7m2 Final     Calcium   Date Value Ref Range Status   10/09/2021 7.8 (L) 8.5 - 10.5 mg/dL Final   10/06/2013 8.6 8.5 - 10.4 mg/dL Final     Lab Results   Component Value Date    WBC 2.9 10/09/2021    WBC 3.0 10/06/2013     Lab Results   Component Value Date    RBC 3.61 10/09/2021    RBC 3.99 10/06/2013     Lab Results   Component Value Date    HGB 8.3 10/09/2021    HGB 11.6 10/06/2013     Lab Results   Component Value Date    HCT 28.9 10/09/2021    HCT 34.4  10/06/2013     Lab Results   Component Value Date    MCV 80 10/09/2021    MCV 86 10/06/2013     Lab Results   Component Value Date    MCH 23.0 10/09/2021    MCH 29.1 10/06/2013     Lab Results   Component Value Date    MCHC 28.7 10/09/2021    MCHC 33.7 10/06/2013     Lab Results   Component Value Date    RDW 21.5 10/09/2021    RDW 15.1 10/06/2013     Lab Results   Component Value Date    PLT 89 10/09/2021    PLT 57 10/06/2013           IMAGING:  CT head: IMPRESSION:  1. Bilateral left larger than right subdural hematomas are unchanged compared with the examination from earlier today.  2. There is probably a small amount of subdural blood along the interhemispheric fissure that is new or increased compared to the CT earlier today.         ASSESSMENT: 82 yo female with asymptomatic L convexity subacute SDH, R thin subdural hygroma vs. Chronic SDH,  and small acute falcine SDH. Given that patient is asymptomatic right now, surgical intervention is not imperative. Patient's  and decision maker does not wish to pursue surgical interventions even if intracranial hemorrhages were to enlarge.       In addition to the assessment of diagnoses detailed above, this 81 year old female  patient admitted from transfer from another acute care hospital has the following conditions contributing to the complexity of their medical care:    Anemia due to acute blood loss and Liver failure ,  Type II diabetes, acute deep venous thrombosis    RECOMMENDATIONS:  No neurosurgical intervention indicated at this time   Neurosurgery will follow peripherally    The patient was discussed with Dr. Raymundo, neurosurgery chief resident, and Dr. Davey, neurosurgery staff, and they agree with the above.    Xi Bergman MD  Neurosurgery Resident, PGY-2

## 2021-10-10 NOTE — CONSULTS
"[facility]    Hepatology consult note      Consult requested by MARYCRUZ Tracey    Reason for consult:  Acute anemia in a patient with esophageal varices and cirrhosis.      81 year old woman with decompensated cirrhosis (ascites, bleeding EV), dementia (living in memory care) and recent subdural hematoma.    Chief complaint: \"acute anemia in a patient with esophageal varices and cirrhosis\"    HPI:  Ms. Jonas Rodriguez is a 81-year-old woman with decompensated cirrhosis with ascites and reports of prior EV bleeding.  GI is consulted for anemia.    She was transferred in the context of anemia, found at an outside hospital.  She lives in a memory care unit due to advanced dementia and has frequent falls.  During this evaluation, her hemoglobin had dropped from 13.2 (on 10/5) and slowly gone down to 7.4 (10/10), with her lowest level of being 6.8.    As part of this evaluation, she underwent a head CT showing a subdural hematoma with midline shift.  She was transferred for neurosurgical evaluation.  When arriving here, she and her family did not want any neurosurgical interventions.     Hemoccult was checked in the ED and was positive.     EGD ([facility]) 12/4/2020 - normal esophagus, duodenal polyp resected and was a tubular adenoma.    EGD 6/12/2020 - varices follow-up - scar from previous banding and a duodenal polyp (biopsied)      Currently, she feels well.  She denies any bleeding or pain.     WBC 2.9, Hgb 7.4, PLT 90, MCV 80, INR 1.53, UCx with 100,000 GNB.      Medical hx Surgical hx   Past Medical History:   Diagnosis Date     Anxiety      Autoimmune disorder (H)      Cancer (H)      Diabetes mellitus (H)      Elbow abrasion 10/6/2013     Fall from standing 10/6/2013     Gastro-oesophageal reflux disease      Hypercholesteraemia      Liver disorder      Vitamin deficiency       No past surgical history on file.       Medications  Prior to Admission medications    Medication Sig Start " Date End Date Taking? Authorizing Provider   bacitracin ointment Apply topically 2 times daily  Patient not taking: Reported on 10/9/2021 10/6/13   Sasha Case APRN CNP   busPIRone (BUSPAR) 5 MG tablet Take 5 mg by mouth daily    Unknown, Entered By History   calcium carbonate-vitamin D 600-200 MG-UNIT TABS Take 1 tablet by mouth 2 times daily Crush and mix with food    Unknown, Entered By History   donepezil (ARICEPT) 10 MG tablet Take 10 mg by mouth daily  10/22/20   Unknown, Entered By History   ibuprofen (ADVIL/MOTRIN) 200 MG tablet Take 400 mg by mouth every 6 hours as needed for pain Crush and give with food    Unknown, Entered By History   memantine (NAMENDA) 10 MG tablet Take 10 mg by mouth 2 times daily    Unknown, Entered By History   nadolol (CORGARD) 40 MG tablet Take 60 mg by mouth daily At 0800    Unknown, Entered By History   nadolol (CORGARD) 40 MG tablet Take 40 mg by mouth every evening At 1630 5/7/21   Unknown, Entered By History   omeprazole (PRILOSEC) 20 MG DR capsule Take 20 mg by mouth every morning (before breakfast) Open capsule and mix with food 7/25/21   Unknown, Entered By History   sertraline (ZOLOFT) 100 MG tablet Take 150 mg by mouth daily Crush and mix with food 9/30/21   Unknown, Entered By History   simvastatin (ZOCOR) 20 MG tablet Take 20 mg by mouth every evening At 1630. Crush and mix with food    Reported, Patient   vitamin D3 (VITAMIN D3) 50 mcg (2000 units) tablet Take 2,000 Units by mouth daily     Reported, Patient       Allergies  Allergies   Allergen Reactions     Lisinopril Angioedema       Family hx Social hx   No family history on file.   Social History     Tobacco Use     Smoking status: Not on file   Substance Use Topics     Alcohol use: No     Drug use: No          Review of systems  A 10-point review of systems was negative.    Examination  /54 (BP Location: Right arm)   Pulse 62   Temp (!) 96.1  F (35.6  C) (Oral)   Resp 14   Wt 59.7 kg (131 lb  9.8 oz)   SpO2 97%   BMI 22.59 kg/m    Body mass index is 22.59 kg/m .    Constitutional: Elderly woman, lying in bed, appears comfortable.  HEENT: No conjunctival icterus, moist mucus membranes.  CV: No edema.  Respiratory: Unlabored breathing  Abdomen:    Non-distended   Non-tender  Skin: No jaundice, warm  Neuro: Alert, moves all extremities, no asterixis  Psych: Oriented to self.  Not to place or time.  Forgetful, though attentive.    Laboratory    WBC 2.9, Hgb 7.4, PLT 90, MCV 80, INR 1.53, UCx with 100,000 GNB.    MELD-Na score: 11 at 10/10/2021  6:02 AM  MELD score: 11 at 10/10/2021  6:02 AM  Calculated from:  Serum Creatinine: 0.83 mg/dL (Using min of 1 mg/dL) at 10/10/2021  6:02 AM  Serum Sodium: 143 mmol/L (Using max of 137 mmol/L) at 10/10/2021  6:02 AM  Total Bilirubin: 1.0 mg/dL at 10/9/2021  9:28 PM  INR(ratio): 1.53 at 10/9/2021  9:28 PM  Age: 81 years       Assessment  Jonas Rodriguez is a 81 year old woman with decompensated cirrhosis (ascites, remote bleeding EV) with recent subdural hematoma and hepatology is consulted in the context of anemia.    Her anemia is likely multifactorial with the subdural hematoma, health/nutritional status playing a role.  Her last EGD was ~1 year ago and at that time she had no esophageal varices and just post-banding ulcer.  Furthermore, she has been on a non-selective beta blocker.  She and her family are not interested in pursuing interventions (such as surgery) for the subdural hematoma with midline shift but had been reported being open to endoscopy if needed.    Despite the ED positive hemoccult (which is a screening test and should not be used for diagnostic GI bleeding), she has no signs of overt GI bleeding.  My suspicion for GI bleeding causing this anemia are low and the drop has been acute from 13.2 on 10/5 to now 7.4 on 10/10, likely largely driven by the subdural hematoma.  We would not recommend putting her through endoscopy at this point, unless she  were to develop overt GI bleeding.    #1 Acute anemia, suspect post hemorrhagic from subdural hematoma and hemodilution  #2 Decompensated cirrhosis with prior EV bleeding and ascites, followed with [facility]  #3 Dementia (residing in memory care)    Plan:  - Recommend paracentesis with fluid studies, including gram stain, cell count & differential, and cultures   - Low sodium diet (< 2g) to reduce ascites and fluid  - Monitor weights qdaily, monitor for fluid accumulation  - Recommend high protein calorie intake for malnutrition (1.1-1.5g/kg per day) and nutrition consultation.   - Monitor for overt GI bleeding    Theron Vega MD  Gastroenterology Fellow, PGY-5    Staffed with attending, Dr. Leventhal.

## 2021-10-10 NOTE — PROGRESS NOTES
Cross cover    Ok to hold Nadolol for HR 54, please check again later and give if HR >60. Please page if HR still <60. Per nursing request, placed parameters, hold for HR <60.  Has a history of variceal bleed. Bedside nursing comfortable with this plan per our discussion.     Dr. Chani Malcolm  Internal Medicine/Pediatrics      This note was created using voice recognition software and may contain minor errors.

## 2021-10-10 NOTE — PHARMACY-ADMISSION MEDICATION HISTORY
Admission Medication History Completed by Pharmacy    See [facility] Admission Navigator for allergy information, preferred outpatient pharmacy, prior to admission medications and immunization status.     Medication History Sources:     Med history completed by Pharmacy at [facility] on 10/9 prior to transfer to [facility]    Changes made to PTA medication list (reason):    Added: None    Deleted: None    Changed: None    Additional Information:    [facility] received pt's MAR from [facility] ([phone]) and updated meds, strengths, and last doses from this document    Prior to Admission medications    Medication Sig Last Dose Taking? Auth Provider   busPIRone (BUSPAR) 5 MG tablet Take 5 mg by mouth daily 10/8/2021 Yes Unknown, Entered By History   calcium carbonate-vitamin D 600-200 MG-UNIT TABS Take 1 tablet by mouth 2 times daily Crush and mix with food 10/8/2021 Yes Unknown, Entered By History   donepezil (ARICEPT) 10 MG tablet Take 10 mg by mouth daily  10/8/2021 Yes Unknown, Entered By History   ibuprofen (ADVIL/MOTRIN) 200 MG tablet Take 400 mg by mouth every 6 hours as needed for pain Crush and give with food Unknown at PRN Yes Unknown, Entered By History   memantine (NAMENDA) 10 MG tablet Take 10 mg by mouth 2 times daily 10/8/2021 Yes Unknown, Entered By History   nadolol (CORGARD) 40 MG tablet Take 60 mg by mouth daily At 0800 10/8/2021 Yes Unknown, Entered By History   nadolol (CORGARD) 40 MG tablet Take 40 mg by mouth every evening At 1630 10/8/2021 Yes Unknown, Entered By History   omeprazole (PRILOSEC) 20 MG DR capsule Take 20 mg by mouth every morning (before breakfast) Open capsule and mix with food 10/8/2021 Yes Unknown, Entered By History   sertraline (ZOLOFT) 100 MG tablet Take 150 mg by mouth daily Crush and mix with food 10/8/2021 Yes Unknown, Entered By History   simvastatin (ZOCOR) 20 MG tablet Take 20 mg by mouth every evening At 1630. Crush and mix with food  10/7/2021 at PM Yes Reported, Patient   vitamin D3 (VITAMIN D3) 50 mcg (2000 units) tablet Take 2,000 Units by mouth daily  10/8/2021 Yes Reported, Patient       Date completed: 10/10/21    Medication history completed by: Hood Quesada, PharmD

## 2021-10-10 NOTE — H&P
[facility]    History and Physical - Hospitalist Service, Gold Night Service       Date of Admission:  10/9/2021    Assessment & Plan      Jonas Rodriguez is a 81 year old female admitted on 10/9/2021. She has a past medical history of advanced dementia, liver cirrhosis with prior GI bleed and esophageal banding who had a fall at her memory care unit on October 8, 2021 that sent her to the [facility].  During her work-up at [facility] she was found to have a worsening subdural hematoma, acute anemia with hemoglobin drop from 11.6 to 6.8g/dL, urinary tract infection, RLE DVT along with recurrent ascites from chronic liver cirrhosis and was transferred from [facility] to [facility] for further evaluation by Neurosurgery and Gastroenterology.    Acute anemia with concern for GI bleed vs. subdural hematoma related  Liver cirrhosis with a history of esophageal varices s/p banding  Moderate ascites  Chronic thrombocytopenia secondary to liver disease  Mrs. Rodriguez denies any known blood in her stool, hematochezia or abdominal pain.  She is hemodynamically stable but she had a large drop in her hemoglobin yesterday that cannot be fully explained by her subdural hematoma.  She has a known history of liver cirrhosis with esophageal varices and has had banding in the past.  CT abdomen and pelvis revealed liver cirrhosis with ascites but no acute blood. There is concern for a GI bleed, but no current symptoms of one. We will plan to start protonix, monitor serial hemoglobin and discuss her acute anemia in the setting of liver disease with GI to see if she would warrant any further work up at this time.     -Check updated CBC and transfuse to maintain hemoglobin of 7 or greater. Continue with hgb check every 8 hours  -Platelets are 97,000 and stable.  Plan to monitor intermittently and transfuse to maintain platelets greater than 20,000  -Gastroenterology  consult to evaluate for possible colonoscopy  -Start Protonix 40mg IV twice daily  -Continue home regimen of nadolol 60 mg once daily in the morning and 40 mg in the evening  -Hold off on Octreotide unless patient develops symptoms of GI bleed (blood in stool, melena, abdominal pain, etc or worsening anemia)  -CAPS consult to perform therapeutic paracentesis tomorrow    Subacute subdural hematoma along left cerebral convexity 9mm with right sided midline shift  -CT head at [facility] from 10/8/21 revealed subdural hematoma that on repeat head CT today at 11:23AM confirmed no acute changes from earlier in the day.  Neurosurgery was consulted and felt it would be prudent to see her and arranged to her admitted to [facility]. In further discussion with the patient and her , it is clear that she would not neurosurgery, even in a life threatening situation.   -Neurosurgery consult to discuss if any further head CTs need to be done, treatment for subdural hematoma and when she will be safe to receive blood thinners, if ever    Advanced dementia  -She lives in a memory care facility and reports feeling safe there.  Continue home regimen of Aricept 10 mg a mouth once daily, Namenda 10 mg by mouth twice daily  -Her  is her primary decision maker and can be reached on cell phone or his home phone for any consents needed for procedures or change in plan of care.    Urinary tract infection  -UA in the ER at [facility] revealed high protein, nitrites, bacteria and mucous but no blood. Urine culture is growing >100,000 cfu of gram negative bacilli. She was started on Rocephin in the ER which we will continue here until susceptibilities return  -Blood cultures drawn in the ER are pending    Right lower extremity gastrocnemius DVT  -Ms. Reeder and has no lower extremity swelling nor pain in her calf.  This was found incidentally during her work-up in the [facility] ER.  No anticoagulation indicated given concerns of  her worsening subdural hematoma and potential GI bleed.  We will plan to provide mechanical prophylaxis when she is able to tolerate this  -IR consult with consideration for IVC filter placement in the next day or 2 when she is stable    Chronic leukopenia  -White blood cell count is 3.4 and has been low for over 10 years.  No acute work-up needed at this time.    Depression  -Continue home regimen of Zoloft 150 mg by mouth once daily    Hyperlipidemia  -Continue Zocor 20 mg by mouth once daily in the evening       Diet: NPO for Medical/Clinical Reasons Except for: Meds, Ice Chips    DVT Prophylaxis: contraindicated  Ojeda Catheter: Not present  Central Lines: None  Code Status:  DNI / but would want chest compressions (per POLST form and discussion with )    Clinically Significant Risk Factors Present on Admission             # Coagulation Defect: INR = 1.53 (Ref range: 0.85 - 1.15) and/or PTT = 36 Seconds (Ref range: 22 - 38 Seconds) on admission, will monitor for bleeding  # Thrombocytopenia: Plts = 97 10e3/uL (Ref range: 150 - 450 10e3/uL) on admission, will monitor for bleeding      Disposition Plan   Expected discharge: from memory care facility, will return there once medically stable      MARYCRUZ [place]  [facility]  Securely message with the "[employer] Web Console (learn more here)  Text page via [employer] Paging/Directory  Please see sign in/sign out for up to date coverage information    ______________________________________________________________________    Chief Complaint   Subdural hematoma    History of Present Illness   Jonas Rodriguez is a 81 year old female admitted on 10/9/2021. She has a past medical history of advanced dementia, liver cirrhosis with prior GI bleed and esophageal banding who had a fall at her memory care unit on October 8, 2021 that sent her to the [facility].  During her work-up at [facility] serial head CT  imaging demonstrated continued bilateral left larger than right subdural hematomas with the most recent imaging demonstrating a possible small amount of subdural blood along the interhemispheric fissure that may be new compared to prior CT scans.  She was also found to have acute anemia with a hemoglobin drop from 11.6-6.8.  She was given 1 unit of blood on October 8, 2021 and her hemoglobin improved to 8.3 g/dL.  A fecal Hemoccult was checked and positive.  No arlin blood has been noted in the stool.  She has not been vomiting any blood and denies any abdominal pain.  Her work-up also revealed a urinary tract infection and a right lower extremity DVT.  Lower extremity ultrasound from October 9, 2021 reveals a short segment of occlusive thrombus in the right gastrocnemius vein of the upper calf with no other DVT present.  It was felt that she needed a neurosurgery evaluation and gastroenterology evaluation and was recommended for transfer to [facility].  Discussion was had with neurosurgery prior to transfer.    Upon arrival to [facility] [place] Ms. Fabby Liu is hemodynamically stable and her vital signs include temp 98.8 Fahrenheit, /45, heart rate 61 and regular and she is 95% on room air.  She denies any chest pain, shortness of breath, abdominal pain, fevers, chills, new rashes or sores.  She is resting comfortably in bed and able to answer my questions appropriately.  We discussed her medical history current medical findings and plan of care with her , Zhang Liu, over the phone.  Ms. westley Liu due to her advanced dementia is not able to make her own medical decisions and Blake her medical decision maker.  He felt that she would not want any neurosurgical interventions but would want intervention to stop any GI bleeds as she has had this procedure in the past and tolerated it okay.  After discussion with neurosurgery given that the patient does not want  neurosurgical intervention she was admitted to internal medicine for further care of her advancing subdural hematoma, acute anemia and concern for potential GI bleed, urinary tract infection, falls and right lower extremity DVT.    Review of Systems    The 10 point Review of Systems is negative other than noted in the HPI or here.     Past Medical History    I have reviewed this patient's medical history and updated it with pertinent information if needed.   Past Medical History:   Diagnosis Date     Anxiety      Autoimmune disorder (H)      Cancer (H)      Diabetes mellitus (H)      Elbow abrasion 10/6/2013     Fall from standing 10/6/2013     Gastro-oesophageal reflux disease      Hypercholesteraemia      Liver disorder      Vitamin deficiency        Past Surgical History   I have reviewed this patient's surgical history and updated it with pertinent information if needed.  No past surgical history on file.    Social History   I have reviewed this patient's social history and updated it with pertinent information if needed.  Social History     Tobacco Use     Smoking status: Not on file   Substance Use Topics     Alcohol use: No     Drug use: No       Family History         Prior to Admission Medications   Prior to Admission Medications   Prescriptions Last Dose Informant Patient Reported? Taking?   bacitracin ointment   No No   Sig: Apply topically 2 times daily   Patient not taking: Reported on 10/9/2021   busPIRone (BUSPAR) 5 MG tablet   Yes No   Sig: Take 5 mg by mouth daily   calcium carbonate-vitamin D 600-200 MG-UNIT TABS   Yes No   Sig: Take 1 tablet by mouth 2 times daily Crush and mix with food   donepezil (ARICEPT) 10 MG tablet   Yes No   Sig: Take 10 mg by mouth daily    ibuprofen (ADVIL/MOTRIN) 200 MG tablet   Yes No   Sig: Take 400 mg by mouth every 6 hours as needed for pain Crush and give with food   memantine (NAMENDA) 10 MG tablet   Yes No   Sig: Take 10 mg by mouth 2 times daily   nadolol  (CORGARD) 40 MG tablet   Yes No   Sig: Take 40 mg by mouth every evening At 1630   nadolol (CORGARD) 40 MG tablet   Yes No   Sig: Take 60 mg by mouth daily At 0800   omeprazole (PRILOSEC) 20 MG DR capsule   Yes No   Sig: Take 20 mg by mouth every morning (before breakfast) Open capsule and mix with food   sertraline (ZOLOFT) 100 MG tablet   Yes No   Sig: Take 150 mg by mouth daily Crush and mix with food   simvastatin (ZOCOR) 20 MG tablet   Yes No   Sig: Take 20 mg by mouth every evening At 1630. Crush and mix with food   vitamin D3 (VITAMIN D3) 50 mcg (2000 units) tablet   Yes No   Sig: Take 2,000 Units by mouth daily       Facility-Administered Medications: None     Allergies   Allergies   Allergen Reactions     Lisinopril Angioedema       Physical Exam   Vital Signs: Temp: 98.8  F (37.1  C)   BP: 131/45 Pulse: 61   Resp: 16 SpO2: 95 % O2 Device: None (Room air)    Weight: 131 lbs 9.83 oz    General Appearance: 81 year old female resting in bed, denies pain, no acute distress  HEENT: normocephalic, atraumatic, PERRLA  Respiratory: mildly dyspneic on exam,   Cardiovascular: regular rate and rhythm, no appreciable murmurs, rubs or gallops  GI: moderately distended abdomen, tinkering bowel sounds present, non-tender to palpation throughout, no rebound or guarding  Skin: warm, dry, ecchymosis over right antecubital space from blood drawn, lower extremities with darkened skin changes around ankles, no open sores or lesions  Musculoskeletal: maintains equal 4/5 strength in bilateral upper and lower extremities  Neurologic: no focal neuro deficits  Psychiatric: alert, oriented to name, able to answer questions about symptoms, her , remains calm and denies any depressed mood, hallucinations or feeling unsafe    Data   Data reviewed today: I reviewed all medications, new labs and imaging results over the last 24 hours.     Recent Labs   Lab 10/09/21  2128 10/09/21  1040 10/09/21  0146 10/08/21  1507 10/08/21  1500  10/07/21  1220   WBC 3.4* 2.9*  --   --  3.9*  --    HGB 7.9* 8.3*  --   --  6.8*  --    MCV 79 80  --   --  79  --    PLT 97* 89*  --   --  104*  --    INR 1.53*  --  1.52*  --   --   --     139  --  140  --    < >   POTASSIUM 3.9 4.1  --  4.7  --    < >   CHLORIDE 111* 107  --  108*  --    < >   CO2 26 23  --  26  --    < >   BUN 16 13  --  15  --    < >   CR 0.81 0.75  --  0.79  --    < >   ANIONGAP 5 9  --  6  --    < >   KISHAN 7.9* 7.8*  --  8.4*  --    < >   GLC 84 86  --  121  --    < >   ALBUMIN 1.8*  --   --  2.3*  --   --    PROTTOTAL 6.7*  --   --  6.9  --   --    BILITOTAL 1.0  --   --  0.6  --   --    ALKPHOS 70  --   --  77  --   --    ALT 27  --   --  22  --   --    AST 63*  --   --  51*  --   --     < > = values in this interval not displayed.

## 2021-10-10 NOTE — CONSULTS
Consult and Procedure Service - Procedure Note    Attending:Eveline Ley MD   Procedure: Diagnostic and therapeutic paracentesis  Indication: ascites,comfort  Risk Assessment: med  Pre-procedure diagnosis: ascites  Post-procedure diagnosis: ascites    The risks and benefits of the procedure were explained to Jonas and her  Zhang who expressed understanding and opted to proceed.  Consent was obtained and placed in the chart.  A time out was performed.  An area of ascites was located and marked using ultrasound guidance in the lt lower quadrant; the area was prepped and draped in the usual sterile fashion.  7 ml of 1% lidocaine was instilled and ascites located.  The paracentesis catheter and needle were inserted under real-time guidance until ascites obtained then the needle removed and the catheter advanced.  The apparatus was connected to vacuum bottles and a total of 5000 ml of clear yellow colored fluid removed.   A specimen was sent for analysis. The catheter was withdrawn and the area dressed.  Patient tolerated the procedure well with no immediate complications.  Please contact the Consult and Procedure Service if any complications or concerns arise.     Eveline Ley MD   DOS: 10/10/2021

## 2021-10-10 NOTE — PROGRESS NOTES
[facility]    Medicine Progress Note - Hospitalist Service, Gold 10       Date of Admission:  10/9/2021    Assessment & Plan         Jonas Rodriguez is a 81 year old female admitted on 10/9/2021. She has a past medical history of advanced dementia, liver cirrhosis with prior GI bleed and esophageal banding who had a fall at her memory care unit on October 8, 2021 that sent her to the [facility].  During her work-up at [facility] she was found to have a worsening subdural hematoma, urinary tract infection, RLE DVT along with recurrent ascites from chronic liver cirrhosis and was [facility] for further evaluation by Neurosurgery and Gastroenterology.    Today:  -Daily CBC  -Coags and hemolysis labs in AM  -Para today  -Albumin due to large volume  -Continue ceftriaxone for UTI and until SBP ruled out  -GI deferring EGD or colonoscopy for now  -After discussing risks/benefits of IVC filter with - holding off for today  -Not initiating anticoagulation today due to intracranial bleeding  -Speech eval due to nursing concern about eating/drinking w/o coughing  --per speech- crush pills and place in puree as needed for now-- full eval tomorrow    Subacute subdural hematoma along left cerebral convexity 9mm with right sided midline shift  -CT head at [facility] from 10/8/21 revealed subdural hematoma that on repeat head CT today at 11:23AM confirmed no acute changes from earlier in the day.  Neurosurgery was consulted and felt it would be prudent to see her and arranged to her admitted to [facility]. In further discussion with the patient and her , it is clear that she would not neurosurgery, even in a life threatening situation.   -Neurosurgery doesn't recommend surgery at this time and they confirmed that even if the bleeding enlarged and she was symptomatic-- patient/family wouldn't want surgery  -Will  discuss whether/if ever anticoagulation would be an option    Normocytic anemia: last known hgb was 11 in 2013. I am not able to find more recent data in our records or outside records. Given that there is no obvious overt bleeding (no melena, hematochezia, not bloody ascites on tap, SDH is not large to accomodate that much blood)-- I suspect this has been a slow gradual drop due to anemia of chronic disease vs iron deficiency or both. 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 Per [facility] records (GI able to access) - had EGD >1 year ago and after banding, the repeat EGD showed no varices. Brisk upper bleed is thought to be unlikely. Lower GI bleed also not thought to be likely and the colonic thickening on CT is thought to be due to vascular congestion due to portal hypertension.   MELD-Na score: 11 at 10/10/2021  6:02 AM  MELD score: 11 at 10/10/2021  6:02 AM  Calculated from:  Serum Creatinine: 0.83 mg/dL (Using min of 1 mg/dL) at 10/10/2021  6:02 AM  Serum Sodium: 143 mmol/L (Using max of 137 mmol/L) at 10/10/2021  6:02 AM  Total Bilirubin: 1.0 mg/dL at 10/9/2021  9:28 PM  INR(ratio): 1.53 at 10/9/2021  9:28 PM  Age: 81 years  -Gastroenterology consult, appreciate assistance  -Start Protonix 40mg IV twice daily--likely transition to oral tomorrow  -Continue home regimen of nadolol 60 mg once daily in the morning and 40 mg in the evening  -Hold off on Octreotide unless patient develops symptoms of GI bleed (blood in stool,  melena, abdominal pain, etc or worsening anemia)  -CAPS performed therapeutic paracentesis, appreciate it  --counts pending    Advanced dementia  -She lives in a memory care facility and reports feeling safe there.  Continue home regimen of Aricept 10 mg a mouth once daily, Namenda 10 mg by mouth twice daily  -Her  is her primary decision maker and can be reached on cell phone or his home phone for any consents needed for procedures or change in plan of care.  -spoke with him at bedside today-- he states her mobility is poor and she has had a few falls. She has been in memory care for about 1 month  -Will continue to discuss goals of care     Hypoglycemia: likely due to poor PO  -Hypoglycemia protocol  -Nutrition assessment    Urinary tract infection  -UA in the ER at [facility] revealed high protein, nitrites, bacteria and mucous but no blood. Urine culture is growing >100,000 cfu of gram negative bacilli. She was started on Rocephin in the ER which we will continue here until susceptibilities return  -continue ceftriaxone  -await final culture results    Right lower extremity gastrocnemius DVT  -Ms. Reeder and has no lower extremity swelling nor pain in her calf.  This was found incidentally during her work-up in the [facility] ER.  No anticoagulation indicated given concerns of her worsening subdural hematoma   -Hold on anticoagulation for now  -Will continue to discuss benefits/risks of IVC filter and consult IR if wanting to move foward    Depression  -Continue home regimen of Zoloft 150 mg by mouth once daily    Hyperlipidemia  -Continue Zocor 20 mg by mouth once daily in the evening       Diet: 2 Gram Sodium Diet    DVT Prophylaxis: Pneumatic Compression Devices  Ojeda Catheter: Not present  Central Lines: None  Code Status:   DNI but would want chest compressions (per GEOVANNI, discussion with )    Disposition Plan   Expected discharge: 10/12/2021   recommended to prior living arrangement once  antibiotic plan established, hemoglobin stable, safe disposition plan/ TCU bed available and SIRS/Sepsis treated.     The patient's care was discussed with the Bedside Nurse, Patient, Patient's Family and GI and Neurosurgery Consultant.    Kaley Rose MD  Hospitalist Service, [address]  Securely message with the [employer] Web Console (learn more here)  Text page via [facility] Paging/Directory  Please see sign in/sign out for up to date coverage information    Clinically Significant Risk Factors Present on Admission             # Coagulation Defect: INR = 1.53 (Ref range: 0.85 - 1.15) and/or PTT = 36 Seconds (Ref range: 22 - 38 Seconds) on admission, will monitor for bleeding  # Thrombocytopenia: Plts = 90 10e3/uL (Ref range: 150 - 450 10e3/uL) on admission, will monitor for bleeding      ______________________________________________________________________    Interval History   She feels comfortable presently. Denies feeling like her belly is overly distended. Denies pain in her lower legs. No nausea or vomiting. Thinks she is having a bowel movement- is incontinent.  at bedside. No fever/chills. No events overnight. Nursing notes reviewed.    Data reviewed today: I reviewed all medications, new labs and imaging results over the last 24 hours. I personally reviewed no images or EKG's today.    Physical Exam   Vital Signs: Temp: 98  F (36.7  C) Temp src: Oral BP: 112/44 Pulse: 77   Resp: 15 SpO2: 97 % O2 Device: None (Room air)    Weight: 131 lbs 9.83 oz  Constitutional: Thin woman, resting in bed, not moving much.   Head: Normocephalic. Atraumatic.   EENT: no scleral icterus  Cardiovascular: Regular rate and rhythm. No murmurs, clicks, gallops or rubs. No significant edema  Respiratory: Clear to auscultation without wheezes or crackles. Normal respiratory rate and effort.   Gastrointestinal: Abdomen soft, does appear distended due to ascites. Nontender  throughout. Bowel sounds active.  Musculoskeletal: extremities normal- no gross deformities noted - no asymmetric swelling, pain to palpation in lower extremities  Skin: no jaundice  Psychiatric: answers questions with yes/no    Data   Recent Labs   Lab 10/10/21  1538 10/10/21  1413 10/10/21  0721 10/10/21  0649 10/10/21  0602 10/09/21  2128 10/09/21  2128 10/09/21  1040 10/09/21  1040 10/09/21  0146 10/08/21  1507 10/08/21  1507   WBC  --   --   --   --  2.9*  --  3.4*  --  2.9*  --    < >  --    HGB  --  8.0*  --   --  7.4*  --  7.9*   < > 8.3*  --    < >  --    MCV  --   --   --   --  80  --  79  --  80  --    < >  --    PLT  --   --   --   --  90*  --  97*  --  89*  --    < >  --    INR  --   --   --   --   --   --  1.53*  --   --  1.52*  --   --    NA  --   --   --   --  143  --  142  --  139  --    < > 140   POTASSIUM  --   --   --   --  3.8  --  3.9  --  4.1  --    < > 4.7   CHLORIDE  --   --   --   --  113*  --  111*  --  107  --    < > 108*   CO2  --   --   --   --  26  --  26  --  23  --    < > 26   BUN  --   --   --   --  19  --  16  --  13  --    < > 15   CR  --   --   --   --  0.83  --  0.81  --  0.75  --    < > 0.79   ANIONGAP  --   --   --   --  4  --  5  --  9  --    < > 6   KISHAN  --   --   --   --  7.7*  --  7.9*  --  7.8*  --    < > 8.4*   GLC 70  --  161* 62* 63*   < > 84   < > 86  --    < > 121   ALBUMIN  --   --   --   --   --   --  1.8*  --   --   --   --  2.3*   PROTTOTAL  --   --   --   --   --   --  6.7*  --   --   --   --  6.9   BILITOTAL  --   --   --   --   --   --  1.0  --   --   --   --  0.6   ALKPHOS  --   --   --   --   --   --  70  --   --   --   --  77   ALT  --   --   --   --   --   --  27  --   --   --   --  22   AST  --   --   --   --   --   --  63*  --   --   --   --  51*    < > = values in this interval not displayed.       Medications       busPIRone  5 mg Oral Daily     calcium carbonate 600 mg-vitamin D 400 units  1 tablet Oral BID     cefTRIAXone  1 g Intravenous Q24H     -  MEDICATION INSTRUCTIONS -   Does not apply See Admin Instructions     donepezil  10 mg Oral Daily     memantine  10 mg Oral BID     nadolol  40 mg Oral QPM     nadolol  60 mg Oral Daily     pantoprazole (PROTONIX) IV  40 mg Intravenous BID     sertraline  150 mg Oral Daily     simvastatin  20 mg Oral QPM     sodium chloride (PF)  3 mL Intracatheter Q8H     vitamin D3  50 mcg Oral Daily

## 2021-10-10 NOTE — PROGRESS NOTES
10/10/21 1505   Quick Adds   Type of Visit Initial PT Evaluation   Living Environment   People in home facility resident   Current Living Arrangements assisted living  (memory care)   Home Accessibility no concerns   Transportation Anticipated family or friend will provide   Living Environment Comments Pt lives in memory care unit,  lives at home.   Self-Care   Usual Activity Tolerance fair   Current Activity Tolerance poor   Equipment Currently Used at Home walker, standard   Activity/Exercise/Self-Care Comment Has PT at facility, very little activity between PT visits, per . Assist with bathing and ADLs at facility.  reports pt uses FWW, although doesn't walk very much   Disability/Function   Hearing Difficulty or Deaf no   Wear Glasses or Blind yes   Vision Management glasses   Concentrating, Remembering or Making Decisions Difficulty yes   Concentration Management at memory care facility   Difficulty Communicating no   Walking or Climbing Stairs Difficulty yes   Walking or Climbing Stairs ambulation difficulty, requires equipment;ambulation difficulty, assistance 1 person   Mobility Management walker, assistance   Dressing/Bathing Difficulty yes   Dressing/Bathing bathing difficulty, assistance 1 person   Doing Errands Independently Difficulty (such as shopping) yes   Errands Management at facility   Fall history within last six months yes   Number of times patient has fallen within last six months 3  ( reports about 3 falls recently)   Change in Functional Status Since Onset of Current Illness/Injury yes   General Information   Onset of Illness/Injury or Date of Surgery 10/09/21   Referring Physician Sav Poe Ohl, DO   Patient/Family Therapy Goals Statement (PT) decrease falls, return to home/facility   Pertinent History of Current Problem (include personal factors and/or comorbidities that impact the POC) Per chart: Jonas Rodriguez is a 81 year old female admitted on 10/9/2021.  She has a past medical history of advanced dementia, liver cirrhosis with prior GI bleed and esophageal banding who had a fall at her memory care unit on October 8, 2021 that sent her to the [facility].  During her work-up at [facility] she was found to have a worsening subdural hematoma, acute anemia with hemoglobin drop from 11.6 to 6.8g/dL, urinary tract infection, RLE DVT along with recurrent ascites from chronic liver cirrhosis and was transferred from [facility] to [facility] for further evaluation by Neurosurgery and Gastroenterology.   Existing Precautions/Restrictions fall   General Observations Activity: up with assist   Cognition   Orientation Status (Cognition) oriented to;person   Affect/Mental Status (Cognition) other (see comments)  (dementia, pleasant)   Follows Commands (Cognition) follows one-step commands   Pain Assessment   Patient Currently in Pain No   Integumentary/Edema   Integumentary/Edema no deficits were identifed   Posture    Posture Forward head position;Protracted shoulders   Range of Motion (ROM)   ROM Quick Adds ROM WFL   Strength   Strength Comments generally deconditioned, grossly >3/5 based on mobility   Bed Mobility   Comment (Bed Mobility) min A supine to sit   Transfers   Transfer Safety Comments min A   Gait/Stairs (Locomotion)   Comment (Gait/Stairs) did not assess due to difficulty with pivot   Balance   Balance Comments assist needed for standing balance, close SBA for sitting balance   Sensory Examination   Sensory Perception patient reports no sensory changes   Clinical Impression   Criteria for Skilled Therapeutic Intervention yes, treatment indicated   PT Diagnosis (PT) impaired functional mobility   Influenced by the following impairments weakness, impaired balance, dementia, impaired endurance   Functional limitations due to impairments gait, transfers, bed mobility, standing balance   Clinical Presentation Evolving/Changing   Clinical  Presentation Rationale >3 body structure and functional impairments requiring moderate complexity clinical reasoning   Clinical Decision Making (Complexity) moderate complexity   Therapy Frequency (PT) 4x/week   Predicted Duration of Therapy Intervention (days/wks) 1 week   Planned Therapy Interventions (PT) balance training;bed mobility training;gait training;neuromuscular re-education;home exercise program;strengthening;transfer training   Risk & Benefits of therapy have been explained evaluation/treatment results reviewed;care plan/treatment goals reviewed;risks/benefits reviewed;current/potential barriers reviewed;participants voiced agreement with care plan;participants included;patient;spouse/significant other   Clinical Impression Comments Pt demo's impaired functional mobility and would benefit from physical therapy   PT Discharge Planning    PT Discharge Recommendation (DC Rec) home with assist;home with home care physical therapy  (back to memory care facility)   PT Rationale for DC Rec Pt requires assist for ADLs and all mobility, would benefit from continued physical therapy to reduce falls risk. Would also benefit from 24/7 supervision due to falls at night while unsupervised.   PT Brief overview of current status  Ax1 for pivot to chair with GB, have not assessed gait   Total Evaluation Time   Total Evaluation Time (Minutes) 8

## 2021-10-10 NOTE — PLAN OF CARE
Status: Pt admitted d/t subdural hemorrhage, GI bleed, lower extremity DVT, acute anemia, & UTI. Hx of advanced dementia, falls, liver cirrhosis  Vitals: VSS on RA. Bradycardic at times. On CCM  Neuros: Lethargic to somnolent. Oriented to self. Slow to respond. 4/5 strengths  IV: PIV TKO  Labs/Electrolytes: Hgb 8.0 @ 1400   Resp/trach: WNL  Diet: Regular diet. Patient has difficulty swallowing thin liquids/pills. Per speech, recommend crushing pills and placing in pudding/applesauce as needed until speech eval tomorrow 10/11  Bowel status: 2x loose/soft BMs today  : Voiding with incontinence. Bladder scanned for over 1000mL prior to bedside paracentesis was completed. 100mL output when straight cathed. Possible that ascites fluid is being captured by bladder scan volume  Skin: No new deficits  Pain: Denies  Activity: Per PT, A1/GB  Social:  at bedside and supportive  Plan: Continue to monitor and follow POC

## 2021-10-10 NOTE — PROGRESS NOTES
Pt. Had rocephin infusion due at 0000, rescheduled to 0100 d/t waiting on equipment. Once equipment arrived, pt. IV flushed and found to be leaking so removed at this time and routine vascular access consult placed. At 0200 pt still with no IV access, order changed to ASAP at this time. At 0230 pt still with no IV access. At 0230 changed to STAT and now 0320 and still no access. Vascular called at 0320 and no answer. Will continue to work on IV placement and contacting vascular

## 2021-10-10 NOTE — PLAN OF CARE
Status: Pt admitted d/t subdural hemorrhage, GI bleed, lower extremity DVT, acute anemia, & UTI. Hx of advanced dementia, falls, liver cirrhosis  Vitals: VSS, on RA  Neuros: D/o time, place, and situation. Slow to respond. Denies N/T. Strengths 4/5 throughout.   IV: PIV SL  Labs/Electrolytes: Hgb 7.9, hgb checks q8hrs  Resp/trach: Denies SOB.  Diet: NPO except for meds and ice chips  Bowel status: LBM unknown  : voiding, incontinent  Skin: non-blanchable redness to coccyx/sacrum, mepilex in place. Scattered bruising to forearms.   Pain: denies  Activity: Not OOB this shift  Social:  Zhang at bedside this evening  Plan: Continue to monitor and follow POC

## 2021-10-10 NOTE — PLAN OF CARE
Arrived from:  [facility]  Belongings/meds:  Personal clothings at bedside, glasses on pt  2 RN Skin Assessment Completed by:  Juana PARISI RN & Karol LEWIS RN  Non-intact findings documented (yes/no/NA): PIV L arm, Bruises bilateral arms, non-blanchable redness to coccyx/sacrum - placed preventative mepilex on coccyx during skin assessment

## 2021-10-10 NOTE — PLAN OF CARE
Status: Pt admitted d/t subdural hemorrhage, GI bleed, lower extremity DVT, acute anemia, & UTI. Hx of advanced dementia, falls, liver cirrhosis  Vitals: VSS on RA, bradycardia (55-60bpm). On CCM  Neuros: A&O to self, hospital with choices and year with choices. D/o to exact month/date and situation. Slow to respond. Denies N/T. Strengths 4/5 throughout.   IV: PIV infusing TKO   Labs/Electrolytes: Hgb 7.9, hgb checks q8hrs, transfuse if <7  Resp/trach: Denies SOB. Continuous pulse ox mid-high 90s on RA  Diet: NPO except for meds and ice chips  Bowel status: LBM unknown. BS+. Abdomen distended, nontender.   : voiding, incontinent  Skin: non-blanchable redness to coccyx/sacrum, mepilex in place. Scattered bruising to forearms.   Pain: denies  Activity: Not OOB this shift. Turn/repo q2hrs   Plan: Consult GI for possible colonoscopy, IR with consideration for IVC filter, neurosurg to discuss possible further imaging/follow-up of her subdural hematomas. Start protonix. Plan for therapeutic paracentesis. Continue to monitor and follow POC

## 2021-10-11 ENCOUNTER — APPOINTMENT (OUTPATIENT)
Dept: PHYSICAL THERAPY | Facility: CLINIC | Age: 81
DRG: 064 | End: 2021-10-11
Attending: INTERNAL MEDICINE
Payer: COMMERCIAL

## 2021-10-11 ENCOUNTER — APPOINTMENT (OUTPATIENT)
Dept: SPEECH THERAPY | Facility: CLINIC | Age: 81
DRG: 064 | End: 2021-10-11
Attending: INTERNAL MEDICINE
Payer: COMMERCIAL

## 2021-10-11 LAB
ALBUMIN SERPL-MCNC: 1.9 G/DL (ref 3.4–5)
ALP SERPL-CCNC: 62 U/L (ref 40–150)
ALT SERPL W P-5'-P-CCNC: 21 U/L (ref 0–50)
ANION GAP SERPL CALCULATED.3IONS-SCNC: 5 MMOL/L (ref 3–14)
AST SERPL W P-5'-P-CCNC: 43 U/L (ref 0–45)
BACTERIA UR CULT: ABNORMAL
BASOPHILS # BLD AUTO: 0 10E3/UL (ref 0–0.2)
BASOPHILS # BLD AUTO: 0 10E3/UL (ref 0–0.2)
BASOPHILS NFR BLD AUTO: 0 %
BASOPHILS NFR BLD AUTO: 1 %
BILIRUB SERPL-MCNC: 0.5 MG/DL (ref 0.2–1.3)
BUN SERPL-MCNC: 20 MG/DL (ref 7–30)
CALCIUM SERPL-MCNC: 7.7 MG/DL (ref 8.5–10.1)
CHLORIDE BLD-SCNC: 114 MMOL/L (ref 94–109)
CO2 SERPL-SCNC: 24 MMOL/L (ref 20–32)
CREAT SERPL-MCNC: 0.85 MG/DL (ref 0.52–1.04)
EOSINOPHIL # BLD AUTO: 0.1 10E3/UL (ref 0–0.7)
EOSINOPHIL # BLD AUTO: 0.1 10E3/UL (ref 0–0.7)
EOSINOPHIL NFR BLD AUTO: 3 %
EOSINOPHIL NFR BLD AUTO: 3 %
ERYTHROCYTE [DISTWIDTH] IN BLOOD BY AUTOMATED COUNT: 22.7 % (ref 10–15)
ERYTHROCYTE [DISTWIDTH] IN BLOOD BY AUTOMATED COUNT: 22.8 % (ref 10–15)
FIBRINOGEN PPP-MCNC: 190 MG/DL (ref 170–490)
GFR SERPL CREATININE-BSD FRML MDRD: 65 ML/MIN/1.73M2
GLUCOSE BLD-MCNC: 109 MG/DL (ref 70–99)
GLUCOSE BLDC GLUCOMTR-MCNC: 116 MG/DL (ref 70–99)
GLUCOSE BLDC GLUCOMTR-MCNC: 131 MG/DL (ref 70–99)
HAPTOGLOB SERPL-MCNC: 107 MG/DL (ref 32–197)
HCT VFR BLD AUTO: 25 % (ref 35–47)
HCT VFR BLD AUTO: 26 % (ref 35–47)
HGB BLD-MCNC: 7.4 G/DL (ref 11.7–15.7)
HGB BLD-MCNC: 7.4 G/DL (ref 11.7–15.7)
IMM GRANULOCYTES # BLD: 0 10E3/UL
IMM GRANULOCYTES # BLD: 0 10E3/UL
IMM GRANULOCYTES NFR BLD: 0 %
IMM GRANULOCYTES NFR BLD: 0 %
INR PPP: 1.58 (ref 0.85–1.15)
LDH SERPL L TO P-CCNC: 140 U/L (ref 81–234)
LYMPHOCYTES # BLD AUTO: 0.6 10E3/UL (ref 0.8–5.3)
LYMPHOCYTES # BLD AUTO: 0.7 10E3/UL (ref 0.8–5.3)
LYMPHOCYTES NFR BLD AUTO: 20 %
LYMPHOCYTES NFR BLD AUTO: 22 %
MCH RBC QN AUTO: 23.2 PG (ref 26.5–33)
MCH RBC QN AUTO: 23.6 PG (ref 26.5–33)
MCHC RBC AUTO-ENTMCNC: 28.5 G/DL (ref 31.5–36.5)
MCHC RBC AUTO-ENTMCNC: 29.6 G/DL (ref 31.5–36.5)
MCV RBC AUTO: 80 FL (ref 78–100)
MCV RBC AUTO: 82 FL (ref 78–100)
MONOCYTES # BLD AUTO: 0.3 10E3/UL (ref 0–1.3)
MONOCYTES # BLD AUTO: 0.4 10E3/UL (ref 0–1.3)
MONOCYTES NFR BLD AUTO: 10 %
MONOCYTES NFR BLD AUTO: 11 %
NEUTROPHILS # BLD AUTO: 2 10E3/UL (ref 1.6–8.3)
NEUTROPHILS # BLD AUTO: 2.1 10E3/UL (ref 1.6–8.3)
NEUTROPHILS NFR BLD AUTO: 63 %
NEUTROPHILS NFR BLD AUTO: 67 %
NRBC # BLD AUTO: 0 10E3/UL
NRBC # BLD AUTO: 0 10E3/UL
NRBC BLD AUTO-RTO: 0 /100
NRBC BLD AUTO-RTO: 0 /100
PLATELET # BLD AUTO: 112 10E3/UL (ref 150–450)
PLATELET # BLD AUTO: 95 10E3/UL (ref 150–450)
POTASSIUM BLD-SCNC: 3.8 MMOL/L (ref 3.4–5.3)
PROT SERPL-MCNC: 6.1 G/DL (ref 6.8–8.8)
RBC # BLD AUTO: 3.13 10E6/UL (ref 3.8–5.2)
RBC # BLD AUTO: 3.19 10E6/UL (ref 3.8–5.2)
RETICS # AUTO: 0.04 10E6/UL (ref 0.03–0.1)
RETICS/RBC NFR AUTO: 1.4 % (ref 0.5–2)
SODIUM SERPL-SCNC: 143 MMOL/L (ref 133–144)
WBC # BLD AUTO: 3.2 10E3/UL (ref 4–11)
WBC # BLD AUTO: 3.2 10E3/UL (ref 4–11)

## 2021-10-11 PROCEDURE — 97530 THERAPEUTIC ACTIVITIES: CPT | Mod: GP

## 2021-10-11 PROCEDURE — 250N000011 HC RX IP 250 OP 636: Performed by: PHYSICIAN ASSISTANT

## 2021-10-11 PROCEDURE — 120N000002 HC R&B MED SURG/OB UMMC

## 2021-10-11 PROCEDURE — 85045 AUTOMATED RETICULOCYTE COUNT: CPT | Performed by: INTERNAL MEDICINE

## 2021-10-11 PROCEDURE — 250N000013 HC RX MED GY IP 250 OP 250 PS 637: Performed by: INTERNAL MEDICINE

## 2021-10-11 PROCEDURE — 85384 FIBRINOGEN ACTIVITY: CPT | Performed by: INTERNAL MEDICINE

## 2021-10-11 PROCEDURE — 85060 BLOOD SMEAR INTERPRETATION: CPT | Performed by: STUDENT IN AN ORGANIZED HEALTH CARE EDUCATION/TRAINING PROGRAM

## 2021-10-11 PROCEDURE — 85610 PROTHROMBIN TIME: CPT | Performed by: INTERNAL MEDICINE

## 2021-10-11 PROCEDURE — 36415 COLL VENOUS BLD VENIPUNCTURE: CPT | Performed by: INTERNAL MEDICINE

## 2021-10-11 PROCEDURE — 92526 ORAL FUNCTION THERAPY: CPT | Mod: GN

## 2021-10-11 PROCEDURE — C9113 INJ PANTOPRAZOLE SODIUM, VIA: HCPCS | Performed by: PHYSICIAN ASSISTANT

## 2021-10-11 PROCEDURE — 80053 COMPREHEN METABOLIC PANEL: CPT | Performed by: INTERNAL MEDICINE

## 2021-10-11 PROCEDURE — 97116 GAIT TRAINING THERAPY: CPT | Mod: GP

## 2021-10-11 PROCEDURE — G0463 HOSPITAL OUTPT CLINIC VISIT: HCPCS

## 2021-10-11 PROCEDURE — 83615 LACTATE (LD) (LDH) ENZYME: CPT | Performed by: INTERNAL MEDICINE

## 2021-10-11 PROCEDURE — 250N000013 HC RX MED GY IP 250 OP 250 PS 637: Performed by: PHYSICIAN ASSISTANT

## 2021-10-11 PROCEDURE — 83010 ASSAY OF HAPTOGLOBIN QUANT: CPT | Performed by: INTERNAL MEDICINE

## 2021-10-11 PROCEDURE — 85025 COMPLETE CBC W/AUTO DIFF WBC: CPT | Performed by: INTERNAL MEDICINE

## 2021-10-11 PROCEDURE — 92610 EVALUATE SWALLOWING FUNCTION: CPT | Mod: GN

## 2021-10-11 PROCEDURE — 99207 PR NON-BILLABLE SERV PER CHARTING: CPT | Performed by: INTERNAL MEDICINE

## 2021-10-11 PROCEDURE — 99233 SBSQ HOSP IP/OBS HIGH 50: CPT | Performed by: INTERNAL MEDICINE

## 2021-10-11 RX ORDER — CEFDINIR 125 MG/5ML
300 POWDER, FOR SUSPENSION ORAL 2 TIMES DAILY
Status: DISCONTINUED | OUTPATIENT
Start: 2021-10-11 | End: 2021-10-12 | Stop reason: HOSPADM

## 2021-10-11 RX ORDER — CEFDINIR 125 MG/5ML
300 POWDER, FOR SUSPENSION ORAL 2 TIMES DAILY
Qty: 72 ML | Refills: 0 | Status: SHIPPED | OUTPATIENT
Start: 2021-10-12 | End: 2021-10-15

## 2021-10-11 RX ADMIN — CALCIUM CARBONATE 600 MG (1,500 MG)-VITAMIN D3 400 UNIT TABLET 1 TABLET: at 20:44

## 2021-10-11 RX ADMIN — PANTOPRAZOLE SODIUM 40 MG: 40 INJECTION, POWDER, FOR SOLUTION INTRAVENOUS at 08:44

## 2021-10-11 RX ADMIN — BUSPIRONE HYDROCHLORIDE 5 MG: 5 TABLET ORAL at 08:44

## 2021-10-11 RX ADMIN — CALCIUM CARBONATE 600 MG (1,500 MG)-VITAMIN D3 400 UNIT TABLET 1 TABLET: at 08:44

## 2021-10-11 RX ADMIN — SIMVASTATIN 20 MG: 20 TABLET, FILM COATED ORAL at 20:44

## 2021-10-11 RX ADMIN — MEMANTINE 10 MG: 10 TABLET ORAL at 20:44

## 2021-10-11 RX ADMIN — MEMANTINE 10 MG: 10 TABLET ORAL at 08:44

## 2021-10-11 RX ADMIN — CEFDINIR 300 MG: 125 POWDER, FOR SUSPENSION ORAL at 20:45

## 2021-10-11 RX ADMIN — PANTOPRAZOLE SODIUM 40 MG: 40 INJECTION, POWDER, FOR SOLUTION INTRAVENOUS at 20:44

## 2021-10-11 RX ADMIN — CEFDINIR 300 MG: 125 POWDER, FOR SUSPENSION ORAL at 11:11

## 2021-10-11 RX ADMIN — SERTRALINE HYDROCHLORIDE 150 MG: 100 TABLET ORAL at 08:44

## 2021-10-11 RX ADMIN — CEFTRIAXONE SODIUM 1 G: 1 INJECTION, POWDER, FOR SOLUTION INTRAMUSCULAR; INTRAVENOUS at 04:39

## 2021-10-11 RX ADMIN — DONEPEZIL HYDROCHLORIDE 10 MG: 10 TABLET, FILM COATED ORAL at 08:44

## 2021-10-11 RX ADMIN — Medication 50 MCG: at 08:44

## 2021-10-11 ASSESSMENT — ACTIVITIES OF DAILY LIVING (ADL)
ADLS_ACUITY_SCORE: 24

## 2021-10-11 NOTE — PROGRESS NOTES
10/11/21 0856   General Information   Onset of Illness/Injury or Date of Surgery 10/09/21   Referring Physician Sav Poe Ohl, DO   Patient/Family Therapy Goal Statement (SLP) None stated.   Pertinent History of Current Problem The pt is a 81 year old female admitted on 10/9/2021. She has a past medical history of advanced dementia, liver cirrhosis with prior GI bleed and esophageal banding who had a fall at her memory care unit on October 8, 2021 that sent her to the [facility].  During her work-up at [facility] she was found to have a worsening subdural hematoma, urinary tract infection, RLE DVT along with recurrent ascites from chronic liver cirrhosis and was transferred from [facility] to [facility] for further evaluation by Neurosurgery and Gastroenterology. Clinical swallow evaluation ordered per MD d/t nursing noting coughing with pills and thin liquids.   General Observations The pt is confused however pleasant and agreeable to therapy.   Past History of Dysphagia The pt's  reports occasional coughing at home with thin liquids and when taking medications. She consumes regular solids at baseline.   Pain Assessment   Patient Currently in Pain No   Type of Evaluation   Type of Evaluation Swallow Evaluation   Oral Motor   Oral Musculature generally intact   Dentition (Oral Motor)   Dentition (Oral Motor) natural dentition   Facial Symmetry (Oral Motor)   Facial Symmetry (Oral Motor) WNL   Lip Function (Oral Motor)   Lip Range of Motion (Oral Motor) WNL   Tongue Function (Oral Motor)   Tongue ROM (Oral Motor) WNL   Jaw Function (Oral Motor)   Jaw Function (Oral Motor) WNL   Cough/Swallow/Gag Reflex (Oral Motor)   Soft Palate/Velum (Oral Motor) WNL   Volitional Throat Clear/Cough (Oral Motor) WNL   Volitional Swallow (Oral Motor) not tested   Vocal Quality/Secretion Management (Oral Motor)   Vocal Quality (Oral Motor) other (see comments)  (low volume)   Secretion Management  (Oral Motor) WNL   General Swallowing Observations   Current Diet/Method of Nutritional Intake (General Swallowing Observations, NIS) thin liquids (level 0);regular diet   Respiratory Support (General Swallowing Observations) none   Swallowing Evaluation Clinical swallow evaluation   Clinical Swallow Evaluation   Feeding Assistance set up only required   Clinical Swallow Evaluation Textures Trialed thin liquids;mildly thick liquids;pureed;solid foods   Clinical Swallow Eval: Thin Liquid Texture Trial   Mode of Presentation, Thin Liquids cup;self-fed   Volume of Liquid or Food Presented 3 oz   Oral Phase of Swallow Premature pharyngeal entry   Pharyngeal Phase of Swallow coughing/choking;throat clearing;wet vocal quality after swallow   Diagnostic Statement Intermittent s/s of aspiration with single sips of thin liquids including coughing, throat clearing, and wet vocal quality. Functional bolus acceptance and containment.   Clinical Swallow Eval: Mildly Thick Liquids   Mode of Presentation cup;straw;self-fed   Volume Presented 4 oz   Oral Phase WFL   Pharyngeal Phase intact   Diagnostic Statement Pt tolerated small and large sips of mildly thick liquids with no s/s of aspiration.   Clinical Swallow Evaluation: Puree Solid Texture Trial   Mode of Presentation, Puree spoon;self-fed   Volume of Puree Presented 7 oz   Oral Phase, Puree WFL   Pharyngeal Phase, Puree intact   Diagnostic Statement No s/s of aspiration or difficulty with oral phase.   Clinical Swallow Evaluation: Solid Food Texture Trial   Mode of Presentation self-fed   Volume Presented 8 oz   Oral Phase WFL   Pharyngeal Phase intact   Diagnostic Statement Mastication was timely and oral clearance was complete. No s/s of aspiration across trials.   Esophageal Phase of Swallow   Patient reports or presents with symptoms of esophageal dysphagia Yes   Esophageal comments Intermittent belching noted. Pt does have reported hx of reflux.   Swallowing  Recommendations   Diet Consistency Recommendations mildly thick liquids (level 2);regular diet   Supervision Level for Intake distant supervision needed   Mode of Delivery Recommendations bolus size, small   Swallowing Maneuver Recommendations alternate food and liquid intake   Monitoring/Assistance Required (Eating/Swallowing) monitor for cough or change in vocal quality with intake   Recommended Feeding/Eating Techniques (Swallow Eval) maintain upright sitting position for eating   Medication Administration Recommendations, Swallowing (SLP) Whole or crushed in puree   Instrumental Assessment Recommendations instrumental evaluation not recommended at this time   General Therapy Interventions   Planned Therapy Interventions Dysphagia Treatment   Dysphagia treatment Modified diet education;Instruction of safe swallow strategies;Compensatory strategies for swallowing   SLP Therapy Assessment/Plan   Criteria for Skilled Therapeutic Interventions Met (SLP Eval) yes;treatment indicated   SLP Diagnosis Mild oropharyngeal dysphagia   Rehab Potential (SLP Eval) good, to achieve stated therapy goals   Therapy Frequency (SLP Eval) 5 times/wk   Predicted Duration of Therapy Intervention (SLP Eval) 1 week   Comment, Therapy Assessment/Plan (SLP) Clinical swallow evaluation completed per MD order. Pt presents with mild oropharyngeal dysphagia in setting of acute subdural hematoma and generalized weakness; suspect degree of chronic dysphagia. Oral mech was WFL, however vocal intensity noted to be low. Pt independent with feeding following setup. Oral phase was WFL, however do suspect premature spillage to pharynx with thin liquids despite single sips. Overt coughing, throat clearing, and wet vocal quality noted with thin liquids. No s/s of aspiration with mildly thick liquids, puree solids, or regular solids. Recommend downgrade to mildly thick liquids (2) and continue regular solids (7). Pt to sit upright with PO, take small  bites/sips, and alternate liquids/solids. Pt benefits from setup assistance with tray. Pills whole or crushed in puree. SLP will follow for diet tolerance and complete further assessment (instrumental?) as indicated. Suspect pt will benefit from ongoing SLP services at discharge d/t concern for dysphagia PTA.   Therapy Plan Review/Discharge Plan (SLP)   Therapy Plan Review (SLP) evaluation/treatment results reviewed;care plan/treatment goals reviewed;participants voiced agreement with care plan;spouse/significant other   SLP Discharge Planning    SLP Discharge Recommendation (DC Rec) home with home care speech therapy   SLP Rationale for DC Rec Oropharyngeal swallow function is below baseline   SLP Brief overview of current status  Recommend downgrade to mildly thick liquids (2) and continue regular solids (7). Pt to sit upright with PO, take small bites/sips, and alternate liquids/solids. Pt benefits from setup assistance with tray. Pills whole or crushed in puree. SLP will follow for diet tolerance and complete further assessment (instrumental?) as indicated.    Total Evaluation Time   Total Evaluation Time (Minutes) 17

## 2021-10-11 NOTE — PLAN OF CARE
Vitals: VSS on RA except bradycardia intermittently, CCM.   Neuros: Alert and oriented to self, confused. All extremities 4/5.   IV: PIV SL'd  Labs/Electrolytes: Hgb 8  Resp/trach: WNL on RA  Diet: Regular diet, takes meds crushed in applesauce.   Bowel status: Bs+x4, last BM 10/10  : Voiding, intermittent incontinence. Bladder scan 311. No straight cath needed.   Skin: Blanchable redness to coccyx/sacrum-mepilex in place. Bruising to forearms. Bandage to left abdomen CDI.  Pain: Denied.  Activity: Up with assist of 1, GB and walker. Up in chair this afternoon.  Social:  at bedside this shift.   Plan: Continue to monitor and follow POC. Paracentesis completed today.

## 2021-10-11 NOTE — PROGRESS NOTES
"CLINICAL NUTRITION SERVICES - ASSESSMENT NOTE     Nutrition Prescription    RECOMMENDATIONS FOR MDs/PROVIDERS TO ORDER:  Recommend continuing liberalized diet (regular diet) given baseline poor PO intake.     Malnutrition Status:    Severe malnutrition in the context of acute on chronic illness    Recommendations already ordered by Registered Dietitian (RD):  Supplement trial: Vital Cuisine 500 (vanilla) at 10am snack and magic cup (vanilla) at HS snack    Future/Additional Recommendations:  Monitor PO intake and acceptance of nutrition supplements.      REASON FOR ASSESSMENT  Jonas Rodriguez is a/an 81 year old female assessed by the dietitian for Provider Order - Dementia, cirrhosis wanting to optimize nutrition    NUTRITION HISTORY  Clinical History: advanced dementia, liver cirrhosis with prior GI bleed and esophageal banding who had a fall at her memory care unit on October 8, 2021 that sent her to the [facility].  During her work-up at [facility] she was found to have a worsening subdural hematoma, urinary tract infection, RLE DVT along with recurrent ascites from chronic liver cirrhosis and was transferred from [facility] to [facility] for further evaluation by Neurosurgery and Gastroenterology.    Spoke with pt and pt's spouse. Pt tends to eat small meals at baseline. She usually has oatmeal in the morning.     CURRENT NUTRITION ORDERS  Diet: Regular diet, mildly thick (level 2) per SLP  Intake/Tolerance: Pt ate some of her breakfast this morning and was eating lunch during RD visit.     LABS  Labs reviewed    MEDICATIONS  Medications reviewed  Caltrate  Protonix  Vit D3    ANTHROPOMETRICS  Height: 5' 4\"   Most Recent Weight: 59.7 kg (131 lb 9.8 oz)    IBW: 54.5 kg (110%)   BMI: Normal BMI  Weight History: Limited weight history available but weight appears stable over the past 6 months.   Wt Readings from Last 5 Encounters:   10/09/21 59.7 kg (131 lb 9.8 oz)   10/05/13 63.5 kg " (140 lb)     57.6 kg (127 lb) 04/17/2021     Dosing Weight: 60 kg (admit weight)     ASSESSED NUTRITION NEEDS  Estimated Energy Needs: 2692-8988 kcals/day (25 - 30 kcals/kg)  Justification: Maintenance  Estimated Protein Needs: 70-90 grams protein/day (1.2 - 1.5 grams of pro/kg)  Justification: Hypercatabolism with acute illness  Estimated Fluid Needs: 1 mL/kcal  Justification: Maintenance or Per provider pending fluid status    PHYSICAL FINDINGS  See malnutrition section below.    MALNUTRITION  % Intake: < 75% for > 7 days (non-severe)  % Weight Loss: None noted  Subcutaneous Fat Loss: Facial region, Upper arm and Lower arm: Severe  Muscle Loss: Temporal, Facial & jaw region, Scapular bone, Thoracic region (clavicle, acromium bone, deltoid, trapezius, pectoral), Upper arm (bicep, tricep), Lower arm  (forearm), Dorsal hand, Upper leg (quadricep, hamstring) and Posterior calf: Severe  Fluid Accumulation/Edema: None noted  Malnutrition Diagnosis: Severe malnutrition in the context of acute on chronic illness    NUTRITION DIAGNOSIS  Inadequate oral intake related to poor appetite 2/2 medical course as evidenced by spouse report and fat/muscle wasting       INTERVENTIONS  Implementation  Nutrition Education: Discussed importance of adequate nutrition, meal options, and nutrition supplement options with pt's spouse.   Medical food supplement therapy: Vital Cuisine 500 (vanilla) at 10am snack and magic cup (vanilla) at HS snack     Goals  Patient to consume % of nutritionally adequate meal trays TID, or the equivalent with supplements/snacks.     Monitoring/Evaluation  Progress toward goals will be monitored and evaluated per protocol.    Chani Thao RD, LD  6A/7D RD pager [phone]

## 2021-10-11 NOTE — PLAN OF CARE
Status: Pt admitted d/t subdural hemorrhage, GI bleed, lower extremity DVT, acute anemia, & UTI. Hx of advanced dementia, falls, liver cirrhosis  Vitals: VSS on RA, bradycardia (55-60bpm). On CCM  Neuros: A&O to self only. Slow to respond. Denies N/T. Strengths 4/5 throughout.   IV: PIV, SL   Labs/Electrolytes: Hgb 8, hgb checks q8hrs, transfuse if <7  Resp/trach: Denies SOB. Continuous pulse ox mid-high 90s on RA  Diet: 2g Na diet, crush pills in applesauce/pudding  Bowel status: Fecal incontinence, had a small BM this shift   : voiding spontaneously on toilet with intermitt incontinence   Skin: blanchable redness to coccyx/sacrum, mepilex in place. L. Abd site dressing, CDI. Scattered bruising to forearms.   Pain: denies  Activity: A1/GB/Walker. Turn/repo q2hrs   Plan: GI deferring EGD or colonoscopy for now. Speech eval. Anticipated discharge 10/12 recommended to prior living arrangement once antibiotic plan established, hemoglobin stable, safe disposition plan/ TCU bed available and SIRS/Sepsis treated.Continue to monitor and follow POC

## 2021-10-11 NOTE — PROGRESS NOTES
Hepatology Follow up Note         Assessment and Plan:   Jonas Rodriguez is a 81 year old female with PMHx of decompensated cirrhosis of unclear etiology c/b ascites and prior EV bleeding. PMHx also includes advanced dementia. Presented with worsening subdural hematoma with midline shift. Consulted for anemia.    #. Acute on chronic anemia, normocytic  #. Subdural Hematoma  #. Decompensated cirrhosis c/b prior EV bleeding and ascites  Presented with acute on chronic anemia (normocytic) in the setting of worsening subdural hematoma. Patient with brown stools (no melena or hematochezia) and no hematemesis. Hgb 7.4 today from 7.4 on 10/10/21 without overt bleeding. Reticulocyte proliferation index suggests hypoproliferation.    Patient with decompensated cirrhosis, unclear etiology. Patient with metabolic risk factors that may have lead to NAFLD, including coronary artery disease. Unclear if prior history of alcohol overuse. Haptoglobin 107; higher than would be expected in chronic liver disease.          Recommendations:   -- Given anemia stable without s/sx of bleeding, no indication of endoscopy at this time  -- Can consider iron studies outpatient; if deficient, can consider repletion  -- Upon discharge, consider initiation of spironolactone 25mg qdaily for ascites management   * Ensure BMP in 2 weeks post adjustment to evaluate potassium and SCr  -- Recommend high protein diet, low sodium (< 2g)  -- Avoid NSAIDs given age + cirrhosis    Follow up:  -- Patient to follow up with [facility]    HEPATOLOGY TO SIGN OFF. PLEASE CALL IF ANY QUESTIONS/CONCERNS    It has been a pleasure to participate in the care of this patient.  Patient discussed with Hepatology staff, Dr. Leventhal.  Please do not hesitate to contact the Hepatology service with any questions or concerns.     Jocelyn Almeida (Lizzie)  Gastroenterology/Hepatology Fellow  Pager [phone]         Subjective/Objective:   - 24 hour events + nursing notes reviewed   -  Brown stool in the last 24 hours  - Denies any nausea, emesis or hematemesis  - Denies any abdominal pain, fevers or chills         Medications:     Current Facility-Administered Medications   Medication     busPIRone (BUSPAR) tablet 5 mg     calcium carbonate (TUMS) chewable tablet 1,000 mg     calcium carbonate 600 mg-vitamin D 400 units (CALTRATE) per tablet 1 tablet     cefdinir (OMNICEF) suspension 300 mg     Contraindications to both pharmacological and mechanical prophylaxis (must document contraindications for both in this order)     glucose gel 15-30 g    Or     dextrose 50 % injection 25-50 mL    Or     glucagon injection 1 mg     donepezil (ARICEPT) tablet 10 mg     lidocaine (LMX4) cream     lidocaine 1 % 0.1-1 mL     melatonin tablet 1 mg     memantine (NAMENDA) tablet 10 mg     nadolol (CORGARD) tablet 40 mg     nadolol (CORGARD) tablet 60 mg     ondansetron (ZOFRAN-ODT) ODT tab 4 mg    Or     ondansetron (ZOFRAN) injection 4 mg     pantoprazole (PROTONIX) IV push injection 40 mg     sertraline (ZOLOFT) tablet 150 mg     simvastatin (ZOCOR) tablet 20 mg     sodium chloride (PF) 0.9% PF flush 3 mL     sodium chloride (PF) 0.9% PF flush 3 mL     Vitamin D3 (CHOLECALCIFEROL) tablet 50 mcg            Physical Exam:   VS:  /42 (BP Location: Right arm)   Pulse 58   Temp 97.5  F (36.4  C) (Oral)   Resp 14   Wt 59.7 kg (131 lb 9.8 oz)   SpO2 96%   BMI 22.59 kg/m      Wt:   Wt Readings from Last 2 Encounters:   10/09/21 59.7 kg (131 lb 9.8 oz)   10/05/13 63.5 kg (140 lb)      Constitutional: lying in bed, comfortable  Eyes: Conjunctiva anicteric  HEENT: Mucus membranes moist  CV: No Magalis edema  Respiratory: Breathing comfortably on ambient air  Abd: Nondistended, non-tender throughout, no rebound or guarding   Skin: no jaundice  Neuro: A&O x 3         Laboratory:   [facility]  Recent Labs   Lab 10/11/21  0718 10/11/21  0702 10/10/21  1958 10/10/21  1538 10/10/21  0649 10/10/21  0602 10/09/21  2128  10/09/21  2128 10/09/21  1040 10/09/21  1040   NA  --  143  --   --   --  143  --  142  --  139   POTASSIUM  --  3.8  --   --   --  3.8  --  3.9  --  4.1   CHLORIDE  --  114*  --   --   --  113*  --  111*  --  107   KISHAN  --  7.7*  --   --   --  7.7*  --  7.9*  --  7.8*   CO2  --  24  --   --   --  26  --  26  --  23   BUN  --  20  --   --   --  19  --  16  --  13   CR  --  0.85  --   --   --  0.83  --  0.81  --  0.75   * 109* 151* 70   < > 63*   < > 84   < > 86    < > = values in this interval not displayed.     CBC  Recent Labs   Lab 10/11/21  0702 10/10/21  1413 10/10/21  0602 10/09/21  2128 10/09/21  1040 10/09/21  1040   WBC 3.2*  3.2*  --  2.9* 3.4*  --  2.9*   RBC 3.13*  3.19*  --  3.23* 3.47*  --  3.61*   HGB 7.4*  7.4* 8.0* 7.4* 7.9*   < > 8.3*   HCT 25.0*  26.0*  --  25.7* 27.5*  --  28.9*   MCV 80  82  --  80 79  --  80   MCH 23.6*  23.2*  --  22.9* 22.8*  --  23.0*   MCHC 29.6*  28.5*  --  28.8* 28.7*  --  28.7*   RDW 22.8*  22.7*  --  22.1* 22.0*  --  21.5*   PLT 95*  112*  --  90* 97*  --  89*    < > = values in this interval not displayed.     INR  Recent Labs   Lab 10/11/21  0702 10/09/21  2128 10/09/21  0146   INR 1.58* 1.53* 1.52*     LFTs  Recent Labs   Lab 10/11/21  0702 10/09/21  2128 10/08/21  1507   ALKPHOS 62 70 77   AST 43 63* 51*   ALT 21 27 22   BILITOTAL 0.5 1.0 0.6   PROTTOTAL 6.1* 6.7* 6.9   ALBUMIN 1.9* 1.8* 2.3*      Imaging/Procedures/Studies:   No pertinent studies in the last 24 hours

## 2021-10-11 NOTE — CONSULTS
WOC Service Consult Note     S:WOC service consulted for coccyx wound   B:Jonas Rodriguez is a 81 year old female with PMHx of decompensated cirrhosis of unclear etiology c/b ascites and prior EV bleeding. PMHx also includes advanced dementia. Presented with worsening subdural hematoma with midline shift. Consulted for anemia.  A:patient with blanchable erythema to coccyx area, sacrum is prominent but intact and without redness, patient denies pain to area.   R:Continue standard pressure injury prevention interventions.     WOC service will sign off, please re-consult with further concerns.     Maye Saravia RN, CWOCN

## 2021-10-11 NOTE — PROGRESS NOTES
[facility]    Medicine Progress Note - Hospitalist Service, Gold 10       Date of Admission:  10/9/2021    Assessment & Plan            Jonas Rodriguez is a 81 year old female admitted on 10/9/2021. She has a past medical history of advanced dementia, liver cirrhosis with prior GI bleed and esophageal banding who had a fall at her memory care unit on October 8, 2021 that sent her to the [facility].  During her work-up at [facility] she was found to have a worsening subdural hematoma, urinary tract infection, RLE DVT along with recurrent ascites from chronic liver cirrhosis and was transferred from [facility] to [facility] for further evaluation by Neurosurgery and Gastroenterology.    Today:  -transition from IV to oral antibiotics (Klebsiella in urine is susceptible to everything except ampicillin.   -IR consult to discuss benefits/risks of IV filter  -No IVC filter recommended both due to risks and due to the low risk clot location  --repeat US recommended for clot follow-up  -Neurosurg still not recommending intervention for subdural hematoma  -GI recommends starting low dose spironolactone- will hold off due to wanting to avoid hypotension    Subacute subdural hematoma along left cerebral convexity 9mm with right sided midline shift  -CT head at [facility] from 10/8/21 revealed subdural hematoma that on repeat head CT today at 11:23AM confirmed no acute changes from earlier in the day.  Neurosurgery was consulted and felt it would be prudent to see her and arranged to her admitted to [facility]. In further discussion with the patient and her , it is clear that she would not neurosurgery, even in a life threatening situation.   -Neurosurgery doesn't recommend surgery at this time and they confirmed that even if the bleeding enlarged and she was symptomatic-- patient/family wouldn't want surgery  -Will discuss whether/if ever  anticoagulation would be an option    Normocytic anemia: last known hgb was 11 in 2013. I am not able to find more recent data in our records or outside records. Given that there is no obvious overt bleeding (no melena, hematochezia, not bloody ascites on tap, SDH is not large to accomodate that much blood)-- I suspect this has been a slow gradual drop due to anemia of chronic disease vs iron deficiency or both. Hgb has remained quite stable after transfusion  - RPI is low so not retic-ing appropriately- suggesting bone marrow isn't responding adequately  -No signs of hemolysis on labs  -No signs of DIC   -No signs of bleeding    Chronic thrombocytopenia secondary to liver disease- given that other cell counts are down-- will get smear- still pending    Leukopenia- appears to have been present as far back as 2013-- unclear etiology appears to be low lymphocytes    Liver cirrhosis with a history of esophageal varices s/p banding (per  cirrhosis may due to hepatitis as a child)  Moderate ascites  SBP ruled out  She has a known history of liver cirrhosis with esophageal varices and has had banding in the past. Per [place] [facility] records (GI able to access) - had EGD >1 year ago and after banding, the repeat EGD showed no varices. Brisk upper bleed is thought to be unlikely. Lower GI bleed also not thought to be likely and the colonic thickening on CT is thought to be due to vascular congestion due to portal hypertension. MELD is 11  -Gastroenterology consult, appreciate assistance  -transitioned to oral PPI today  -hold nadalol if HR due to lack of varcies on recent EGD, due to bradycardia and risk for falls at home  -Counts not indicating SBP (will watch cultures)-- only 48 cells total in the fluid (mostly macros/monos/mesothelial cells)    Advanced dementia  -She lives in a memory care facility ([facility] in [place]) and reports feeling safe there.  Continue home regimen of Aricept 10 mg a mouth once daily,  Namenda 10 mg by mouth twice daily  -Per PT she is requiring assist with all mobility and with ADLs   -Is incontinent  -Has had two unwitnessed falls. The facility she is at is locked and supervised but nurses are only there until 4:30 PM. There is primary available for consultation but isn't on site  --Suspect she may need higher level of supervision/support  -I expressed that based on her level of disease and function that she would possibly qualify for hospice and her  thought it may be premature but agreed to meet with palliative care  -Her  is her primary decision maker and can be reached on cell phone or his home phone for any consents needed for procedures or change in plan of care.  -palliative care consulted, will see patient in AM, appreciate assistance    Hypoglycemia: likely due to poor PO  -Hypoglycemia protocol  -Nutrition assessment    Urinary tract infection  -UA in the ER at [facility] revealed high protein, nitrites, bacteria and mucous but no blood. Urine culture is growing >100,000 colonies Klebsiella, resistant only to ampicillin  -Received ceftriaxone 10/7, 10/9, 10/10  --transition to oral cefdinir 10/11 and treat through 10/15 for 7 days from 10/9    Right lower extremity gastrocnemius DVT  -thought to be low risk clot for embolizing and risks of IVC filter or anticoagulation outweigh benefit  --IR consulted to discuss benefits/risks with family and doesn't recommend  -clot needs follow-up US    Depression  -Continue home regimen of Zoloft 150 mg by mouth once daily    Hyperlipidemia  -Continue Zocor 20 mg by mouth once daily in the evening         Diet: Regular Diet Adult Mildly Thick (level 2)  Snacks/Supplements Adult: Other; Vital Cuisine 500 (vanilla) at 10am snack and magic cup (vanilla) at HS snack; Between Meals    DVT Prophylaxis: Pneumatic Compression Devices  Ojeda Catheter: Not present  Central Lines: None  Code Status:   DNI but compressions wanted if cardiopulmonary  arrest per POLST and discussion with     Disposition Plan   Expected discharge: 10/13/2021   recommended to prior living arrangement once tolerating PO antibiotic, care facility able to obtain antibiotic, patient/family able to meet with palliative care.     The patient's care was discussed with the Care Coordinator/, Patient, Patient's Family and Palliative Care, GI, Neurosurgery Consultant.    Kaley Rose MD  Hospitalist Service, [address]  Securely message with the Vocera Web Console (learn more here)  Text page via [employer] Paging/Directory  Please see sign in/sign out for up to date coverage information    Clinically Significant Risk Factors Present on Admission           # Severe Malnutrition, POA: based on Reduced intake;Subcutaneous fat loss;Muscle loss (10/11/21 1300)     ______________________________________________________________________    Interval History   No events overnight. No pain this morning. Tolerating meals but speech recommended thickened liquids and purees with pills. No fever/chills. No abdominal pain.  at bedside. No pain in leg. No melena or hematochezia. No vomiting.    Data reviewed today: I reviewed all medications, new labs and imaging results over the last 24 hours. I personally reviewed no images or EKG's today.    Physical Exam   Vital Signs: Temp: 97.5  F (36.4  C) Temp src: Axillary BP: 116/43 Pulse: 59   Resp: 14 SpO2: 98 % O2 Device: None (Room air)    Weight: 131 lbs 9.83 oz  Constitutional: Awake, alert and in no distress. Sitting up comfortably in chair with meal tray  Head: Normocephalic. Atraumatic.   Cardiovascular: Regular rate and rhythm. No murmurs, clicks, gallops or rubs.  Respiratory: Clear to auscultation without wheezes or crackles. Normal respiratory rate and effort.   Gastrointestinal: Abdomen soft, but still appears distended with ascites  Musculoskeletal: right calf slightly  larger than left but nontender  Skin: no jaundice  Neuro: nods and smiles and answers yes/no but doesn't say much        Data   Recent Labs   Lab 10/11/21  1240 10/11/21  0718 10/11/21  0702 10/10/21  1538 10/10/21  1413 10/10/21  0649 10/10/21  0602 10/09/21  2128 10/09/21  2128 10/09/21  1040 10/09/21  0146   WBC  --   --  3.2*  3.2*  --   --   --  2.9*  --  3.4*   < >  --    HGB  --   --  7.4*  7.4*  --  8.0*  --  7.4*   < > 7.9*   < >  --    MCV  --   --  80  82  --   --   --  80  --  79   < >  --    PLT  --   --  95*  112*  --   --   --  90*  --  97*   < >  --    INR  --   --  1.58*  --   --   --   --   --  1.53*  --  1.52*   NA  --   --  143  --   --   --  143  --  142   < >  --    POTASSIUM  --   --  3.8  --   --   --  3.8  --  3.9   < >  --    CHLORIDE  --   --  114*  --   --   --  113*  --  111*   < >  --    CO2  --   --  24  --   --   --  26  --  26   < >  --    BUN  --   --  20  --   --   --  19  --  16   < >  --    CR  --   --  0.85  --   --   --  0.83  --  0.81   < >  --    ANIONGAP  --   --  5  --   --   --  4  --  5   < >  --    KISHAN  --   --  7.7*  --   --   --  7.7*  --  7.9*   < >  --    * 116* 109*   < >  --    < > 63*   < > 84   < >  --    ALBUMIN  --   --  1.9*  --   --   --   --   --  1.8*  --   --    PROTTOTAL  --   --  6.1*  --   --   --   --   --  6.7*  --   --    BILITOTAL  --   --  0.5  --   --   --   --   --  1.0  --   --    ALKPHOS  --   --  62  --   --   --   --   --  70  --   --    ALT  --   --  21  --   --   --   --   --  27  --   --    AST  --   --  43  --   --   --   --   --  63*  --   --     < > = values in this interval not displayed.     Medications       busPIRone  5 mg Oral Daily     calcium carbonate 600 mg-vitamin D 400 units  1 tablet Oral BID     cefdinir  300 mg Oral BID     - MEDICATION INSTRUCTIONS -   Does not apply See Admin Instructions     donepezil  10 mg Oral Daily     memantine  10 mg Oral BID     pantoprazole (PROTONIX) IV  40 mg Intravenous BID      sertraline  150 mg Oral Daily     simvastatin  20 mg Oral QPM     sodium chloride (PF)  3 mL Intracatheter Q8H     vitamin D3  50 mcg Oral Daily

## 2021-10-11 NOTE — PLAN OF CARE
Status: Patient admitted on 10/9 with history of advanced dementia, liver cirrhosis with prior GI bleed and esophageal banding who had a fall at her memory care unit on October 8, 2021 that sent her to the [facility].  During her work-up at [facility] she was found to have a worsening subdural hematoma, urinary tract infection, RLE DVT along with recurrent ascites from chronic liver cirrhosis and was transferred from [facility] to [facility] for further evaluation by Neurosurgery and Gastroenterology.  Vitals: VSS  Neuros: Disoriented to place, time, situation, confused, slow to respond, generalized weakness, strengths 4/5  IV: PIV saline locked  Labs/Electrolytes: WNL, last hgb 7.4  Resp/trach: Lung sounds diminished in lower lobes  Diet: Regular with mildly thick liquids  Bowel status: Last BM yesterday, BS+  : Voiding spontaneously with occasional incontinence  Skin: Sacral Mepilex in place, generalized bruising  Pain: Denies  Activity: Up with A1, walker, gaitbelt  Social: Cooperative with cares,  Zhang at bedside supportive  Plan: Palliative consult tomorrow, discharge to [facility] tomorrow, continue to monitor

## 2021-10-11 NOTE — CONSULTS
Interventional Radiology Consult Service Note    IR consulted for possible IVC filter placement    This is a 81 year old female with past medical history of advanced dementia, liver cirrhosis with prior GI bleed and esophageal banding who had a fall at her memory care unit on October 8, 2021 that sent her to [facility]. During her work-up at [facility] she was found to have a worsening subdural hematoma, acute anemia with hemoglobin drop from 11.6 to 6.8g/dL, urinary tract infection, RLE venous thrombosis along with recurrent ascites from chronic liver cirrhosis. She was transferred from [facility] to [facility] 10/9 for further evaluation by Neurosurgery and Gastroenterology. Pt is unable to be anticoagulated given SDH. IR is consulted for consideration of IVC filter placement.    Case and imaging was reviewed with Dr. Coon from IR. No IVC filter is recommended in this patient. Thrombus is in a superficial muscular vein in the calf. Unclear that anticoagulation would even be indicated if patient was indeed a candidate. Recommend repeat US in 1-2 weeks to ensure clot has not propagated. Additionally care giver should closely monitor for s/sx of LE DVT.    Recommendations were reviewed with Dr. Rose and the patient's  at bedside.    Vitals:   /42 (BP Location: Right arm)   Pulse 58   Temp 97.5  F (36.4  C) (Oral)   Resp 14   Wt 59.7 kg (131 lb 9.8 oz)   SpO2 96%   BMI 22.59 kg/m      Pertinent Labs:     Lab Results   Component Value Date    WBC 3.2 (L) 10/11/2021    WBC 3.2 (L) 10/11/2021    WBC 2.9 (L) 10/10/2021    WBC 3.0 (L) 10/06/2013    WBC 2.4 (L) 10/06/2013    WBC 2.8 (L) 10/05/2013       Lab Results   Component Value Date    HGB 7.4 10/11/2021    HGB 7.4 10/11/2021    HGB 8.0 10/10/2021    HGB 11.6 10/06/2013    HGB 11.0 10/06/2013    HGB 13.2 10/05/2013       Lab Results   Component Value Date    PLT 95 10/11/2021     10/11/2021    PLT 90  10/10/2021    PLT 57 10/06/2013    PLT 45 10/06/2013    PLT 57 10/05/2013       Lab Results   Component Value Date    INR 1.58 (H) 10/11/2021    INR 1.32 (H) 10/06/2013    PTT 36 10/09/2021    PTT 33 10/05/2013       Lab Results   Component Value Date    POTASSIUM 3.8 10/11/2021    POTASSIUM 3.9 10/06/2013        MICHELLE Mcgarry CNP  Interventional Radiology   Pager: [phone]

## 2021-10-12 VITALS
RESPIRATION RATE: 14 BRPM | BODY MASS INDEX: 22.59 KG/M2 | OXYGEN SATURATION: 98 % | HEART RATE: 60 BPM | DIASTOLIC BLOOD PRESSURE: 56 MMHG | WEIGHT: 131.61 LBS | TEMPERATURE: 98.5 F | SYSTOLIC BLOOD PRESSURE: 117 MMHG

## 2021-10-12 LAB
ALBUMIN SERPL-MCNC: 1.9 G/DL (ref 3.4–5)
ALP SERPL-CCNC: 65 U/L (ref 40–150)
ALT SERPL W P-5'-P-CCNC: 24 U/L (ref 0–50)
ANION GAP SERPL CALCULATED.3IONS-SCNC: 4 MMOL/L (ref 3–14)
AST SERPL W P-5'-P-CCNC: 40 U/L (ref 0–45)
BILIRUB SERPL-MCNC: 0.8 MG/DL (ref 0.2–1.3)
BUN SERPL-MCNC: 18 MG/DL (ref 7–30)
CALCIUM SERPL-MCNC: 7.8 MG/DL (ref 8.5–10.1)
CHLORIDE BLD-SCNC: 113 MMOL/L (ref 94–109)
CO2 SERPL-SCNC: 25 MMOL/L (ref 20–32)
CREAT SERPL-MCNC: 0.78 MG/DL (ref 0.52–1.04)
ERYTHROCYTE [DISTWIDTH] IN BLOOD BY AUTOMATED COUNT: 23.2 % (ref 10–15)
GFR SERPL CREATININE-BSD FRML MDRD: 73 ML/MIN/1.73M2
GLUCOSE BLD-MCNC: 112 MG/DL (ref 70–99)
GLUCOSE BLDC GLUCOMTR-MCNC: 96 MG/DL (ref 70–99)
HCT VFR BLD AUTO: 28.9 % (ref 35–47)
HGB BLD-MCNC: 8.2 G/DL (ref 11.7–15.7)
MCH RBC QN AUTO: 23 PG (ref 26.5–33)
MCHC RBC AUTO-ENTMCNC: 28.4 G/DL (ref 31.5–36.5)
MCV RBC AUTO: 81 FL (ref 78–100)
PATH REPORT.COMMENTS IMP SPEC: NORMAL
PATH REPORT.COMMENTS IMP SPEC: NORMAL
PATH REPORT.FINAL DX SPEC: NORMAL
PATH REPORT.MICROSCOPIC SPEC OTHER STN: NORMAL
PATH REPORT.MICROSCOPIC SPEC OTHER STN: NORMAL
PATH REPORT.RELEVANT HX SPEC: NORMAL
PLATELET # BLD AUTO: 100 10E3/UL (ref 150–450)
POTASSIUM BLD-SCNC: 4.2 MMOL/L (ref 3.4–5.3)
PROT SERPL-MCNC: 6.5 G/DL (ref 6.8–8.8)
RBC # BLD AUTO: 3.56 10E6/UL (ref 3.8–5.2)
SODIUM SERPL-SCNC: 142 MMOL/L (ref 133–144)
WBC # BLD AUTO: 3.1 10E3/UL (ref 4–11)

## 2021-10-12 PROCEDURE — 250N000011 HC RX IP 250 OP 636: Performed by: PHYSICIAN ASSISTANT

## 2021-10-12 PROCEDURE — 80053 COMPREHEN METABOLIC PANEL: CPT | Performed by: INTERNAL MEDICINE

## 2021-10-12 PROCEDURE — 99238 HOSP IP/OBS DSCHRG MGMT 30/<: CPT | Performed by: INTERNAL MEDICINE

## 2021-10-12 PROCEDURE — 85014 HEMATOCRIT: CPT | Performed by: INTERNAL MEDICINE

## 2021-10-12 PROCEDURE — C9113 INJ PANTOPRAZOLE SODIUM, VIA: HCPCS | Performed by: PHYSICIAN ASSISTANT

## 2021-10-12 PROCEDURE — 82040 ASSAY OF SERUM ALBUMIN: CPT | Performed by: INTERNAL MEDICINE

## 2021-10-12 PROCEDURE — 250N000013 HC RX MED GY IP 250 OP 250 PS 637: Performed by: PHYSICIAN ASSISTANT

## 2021-10-12 PROCEDURE — 99223 1ST HOSP IP/OBS HIGH 75: CPT | Performed by: INTERNAL MEDICINE

## 2021-10-12 PROCEDURE — 250N000013 HC RX MED GY IP 250 OP 250 PS 637: Performed by: INTERNAL MEDICINE

## 2021-10-12 PROCEDURE — 36415 COLL VENOUS BLD VENIPUNCTURE: CPT | Performed by: INTERNAL MEDICINE

## 2021-10-12 RX ADMIN — Medication 50 MCG: at 09:00

## 2021-10-12 RX ADMIN — DONEPEZIL HYDROCHLORIDE 10 MG: 10 TABLET, FILM COATED ORAL at 09:01

## 2021-10-12 RX ADMIN — SERTRALINE HYDROCHLORIDE 150 MG: 100 TABLET ORAL at 09:00

## 2021-10-12 RX ADMIN — PANTOPRAZOLE SODIUM 40 MG: 40 INJECTION, POWDER, FOR SOLUTION INTRAVENOUS at 08:59

## 2021-10-12 RX ADMIN — CALCIUM CARBONATE 600 MG (1,500 MG)-VITAMIN D3 400 UNIT TABLET 1 TABLET: at 09:01

## 2021-10-12 RX ADMIN — MEMANTINE 10 MG: 10 TABLET ORAL at 09:02

## 2021-10-12 RX ADMIN — BUSPIRONE HYDROCHLORIDE 5 MG: 5 TABLET ORAL at 09:01

## 2021-10-12 RX ADMIN — CEFDINIR 300 MG: 125 POWDER, FOR SUSPENSION ORAL at 09:02

## 2021-10-12 ASSESSMENT — ACTIVITIES OF DAILY LIVING (ADL)
ADLS_ACUITY_SCORE: 25
ADLS_ACUITY_SCORE: 25
ADLS_ACUITY_SCORE: 24
ADLS_ACUITY_SCORE: 25

## 2021-10-12 NOTE — DISCHARGE INSTRUCTIONS
Return to [facility] Care Unit  [place], [place]  Phone:  [phone]  Fax:  [phone]    Referral for hospice was sent to [facility].  ____________________________________________

## 2021-10-12 NOTE — PLAN OF CARE
Physical Therapy Discharge Summary    Reason for therapy discharge:    Discharged to home with home therapy.    Progress towards therapy goal(s). See goals on Care Plan in [facility] electronic health record for goal details.  Goals partially met.  Barriers to achieving goals:   discharge from facility.    Therapy recommendation(s):    Continued therapy is recommended.  Rationale/Recommendations:  Recommend HH PT to promote safe functional mobility and reduce risk for falls.

## 2021-10-12 NOTE — PROGRESS NOTES
I see she already scheduled the stress test and Holter  Verify that she increased the losartan   Schedule a f/u early June [facility]    Medicine Progress Note - Hospitalist Service, Gold 10       Date of Admission:  10/9/2021    Assessment & Plan            Jonas Rodriguez is a 81 year old female admitted on 10/9/2021. She has a past medical history of advanced dementia, liver cirrhosis with prior GI bleed and esophageal banding who had a fall at her memory care unit on October 8, 2021 that sent her to the [facility].  During her work-up at [facility] she was found to have a worsening subdural hematoma, urinary tract infection,  Reported RLE DVT along with recurrent ascites from chronic liver cirrhosis and was transferred from [facility] to [facility] for further evaluation by Neurosurgery and Gastroenterology.    Today:  -Cont oral antibiotics (Klebsiella in urine is pansusceptible)   -Neurosurg still not recommending intervention for subdural hematoma  -Palliative saw pt and , discussed Hospice and would like to pursue  -SW consulted for hospice referral     Subacute subdural hematoma along left cerebral convexity 9mm with right sided midline shift  -CT head at [facility] from 10/8/21 revealed subdural hematoma that on repeat head CT today at 11:23AM confirmed no acute changes from earlier in the day.  Neurosurgery was consulted and felt it would be prudent to see her and arranged to her admitted to [facility]. In further discussion with the patient and her , it is clear that she would not want neurosurgery, even in a life threatening situation.   -Neurosurgery doesn't recommend surgery at this time and they confirmed that even if the bleeding enlarged and she was symptomatic-- patient/family wouldn't want surgery  -Palliative consulted, discussed hospice and pt/family would like to pursue     Normocytic anemia, unclear baseline  Last known hgb was 11 in 2013. I am not able to find more recent data in our records or outside records. Given that  there is no obvious overt bleeding (no melena, hematochezia, not bloody ascites on tap, SDH is not large to accomodate that much blood)-- I suspect this has been a slow gradual drop due to anemia of chronic disease vs iron deficiency or both. Hgb has remained quite stable after transfusion  - RPI is low so not retic-ing appropriately- suggesting bone marrow isn't responding adequately  -No signs of hemolysis on labs  -No signs of DIC   -No signs of active bleeding except for chronic SDH    Chronic thrombocytopenia secondary to liver disease- given that other cell counts are down-- will get smear- still pending    Leukopenia- appears to have been present as far back as 2013-- unclear etiology appears to be low lymphocytes    Liver cirrhosis with a history of esophageal varices s/p banding (per  cirrhosis may due to hepatitis as a child)  Moderate ascites  SBP ruled out  She has a known history of liver cirrhosis with esophageal varices and has had banding in the past. Per [place] [facility] records (GI able to access) - had EGD >1 year ago and after banding, the repeat EGD showed no varices. Brisk upper bleed is thought to be unlikely. Lower GI bleed also not thought to be likely and the colonic thickening on CT is thought to be due to vascular congestion due to portal hypertension. MELD is 11  -Gastroenterology consult, appreciate assistance  -transitioned to oral PPI today  -hold nadalol if HR due to lack of varcies on recent EGD, due to bradycardia and risk for falls at home  -Counts not indicating SBP (will watch cultures)-- only 48 cells total in the fluid (mostly macros/monos/mesothelial cells)    Advanced dementia  -She lives in a memory care facility ([facility] in [place]) and reports feeling safe there.  Continue home regimen of Aricept 10 mg a mouth once daily, Namenda 10 mg by mouth twice daily  -Per PT she is requiring assist with all mobility and with ADLs   -Is incontinent  -Has had two unwitnessed  falls. The facility she is at is locked and supervised but nurses are only there until 4:30 PM. There is primary available for consultation but isn't on site  -Pt and family were recommended to discuss Hospice given pt's advanced dementia and medical conditions. Palliative consulted, discussed Hospice and they elected to pursue   -SW consulted  -Her  is her primary decision maker and can be reached on cell phone or his home phone for any consents needed for procedures or change in plan of care.    Hypoglycemia: likely due to poor PO  -Hypoglycemia protocol  -Nutrition assessment    Urinary tract infection  -UA in the ER at [facility] revealed high protein, nitrites, bacteria and mucous but no blood. Urine culture is growing >100,000 colonies Klebsiella, resistant only to ampicillin  -Received ceftriaxone 10/7, 10/9, 10/10  --transition to oral cefdinir 10/11 and treat through 10/15 for 7 days from 10/9    Right lower extremity gastrocnemius clot   - Low risk for embolization, contraindication for AC  - No need for IVC filter per IR    Depression  -Continue home regimen of Zoloft 150 mg by mouth once daily    Hyperlipidemia  -Continue Zocor 20 mg by mouth once daily in the evening         Diet: Regular Diet Adult Mildly Thick (level 2)  Snacks/Supplements Adult: Other; Vital Cuisine 500 (vanilla) at 10am snack and magic cup (vanilla) at HS snack; Between Meals    DVT Prophylaxis: Pneumatic Compression Devices  Ojeda Catheter: Not present  Central Lines: None  Code Status: No CPR- Do NOT Intubate DNI but compressions wanted if cardiopulmonary arrest per POLST and discussion with     Disposition Plan   Expected discharge: 10/13/21 recommended to prior living arrangement once tolerating PO antibiotic, care facility able to obtain antibiotic, patient/family able to meet with palliative care.     The patient's care was discussed with the Care Coordinator/, Patient, Patient's Family and Palliative  Care, GI, Neurosurgery Consultant.    Ross Linares MD  Hospitalist Service, [address]  Securely message with the [employer] Web Console (learn more here)  Text page via [email or web address] Paging/Directory  Please see sign in/sign out for up to date coverage information    Clinically Significant Risk Factors Present on Admission           # Severe Malnutrition, POA: based on Reduced intake;Subcutaneous fat loss;Muscle loss (10/11/21 1300)     ______________________________________________________________________    Interval History   No events overnight, denies pain, no fever or chills     Data reviewed today: I reviewed all medications, new labs and imaging results over the last 24 hours. I personally reviewed no images or EKG's today.    Physical Exam   Vital Signs: Temp: 98.5  F (36.9  C) Temp src: Axillary BP: 117/56 Pulse: 60   Resp: 14 SpO2: 98 % O2 Device: None (Room air)    Weight: 131 lbs 9.83 oz    Constitutional: Awake, alert, cooperative, no apparent distress  Respiratory: Clear to auscultation bilaterally, no crackles or wheezing  Cardiovascular: Regular rate and rhythm, normal S1 and S2, and no murmur noted, no edema  GI: Distended, Normal bowel sounds, soft  Skin/Integumen: No rashes, no cyanosis          Data   Recent Labs   Lab 10/12/21  0908 10/12/21  0744 10/11/21  1240 10/11/21  0718 10/11/21  0702 10/10/21  1538 10/10/21  1413 10/10/21  0649 10/10/21  0602 10/09/21  2128 10/09/21  2128 10/09/21  1040 10/09/21  0146   WBC 3.1*  --   --   --  3.2*  3.2*  --   --   --  2.9*   < > 3.4*   < >  --    HGB 8.2*  --   --   --  7.4*  7.4*  --  8.0*   < > 7.4*   < > 7.9*   < >  --    MCV 81  --   --   --  80  82  --   --   --  80   < > 79   < >  --    *  --   --   --  95*  112*  --   --   --  90*   < > 97*   < >  --    INR  --   --   --   --  1.58*  --   --   --   --   --  1.53*  --  1.52*     --   --   --  143  --   --   --  143   < > 142   < >   --    POTASSIUM 4.2  --   --   --  3.8  --   --   --  3.8   < > 3.9   < >  --    CHLORIDE 113*  --   --   --  114*  --   --   --  113*   < > 111*   < >  --    CO2 25  --   --   --  24  --   --   --  26   < > 26   < >  --    BUN 18  --   --   --  20  --   --   --  19   < > 16   < >  --    CR 0.78  --   --   --  0.85  --   --   --  0.83   < > 0.81   < >  --    ANIONGAP 4  --   --   --  5  --   --   --  4   < > 5   < >  --    KISHAN 7.8*  --   --   --  7.7*  --   --   --  7.7*   < > 7.9*   < >  --    * 96 131*   < > 109*   < >  --    < > 63*   < > 84   < >  --    ALBUMIN 1.9*  --   --   --  1.9*  --   --   --   --    < > 1.8*  --   --    PROTTOTAL 6.5*  --   --   --  6.1*  --   --   --   --    < > 6.7*  --   --    BILITOTAL 0.8  --   --   --  0.5  --   --   --   --    < > 1.0  --   --    ALKPHOS 65  --   --   --  62  --   --   --   --    < > 70  --   --    ALT 24  --   --   --  21  --   --   --   --    < > 27  --   --    AST 40  --   --   --  43  --   --   --   --    < > 63*  --   --     < > = values in this interval not displayed.     Medications       busPIRone  5 mg Oral Daily     calcium carbonate 600 mg-vitamin D 400 units  1 tablet Oral BID     cefdinir  300 mg Oral BID     - MEDICATION INSTRUCTIONS -   Does not apply See Admin Instructions     donepezil  10 mg Oral Daily     memantine  10 mg Oral BID     pantoprazole (PROTONIX) IV  40 mg Intravenous BID     sertraline  150 mg Oral Daily     simvastatin  20 mg Oral QPM     sodium chloride (PF)  3 mL Intracatheter Q8H     vitamin D3  50 mcg Oral Daily

## 2021-10-12 NOTE — PLAN OF CARE
[id number]  Status: Patient admitted on 10/9 with history of advanced dementia, liver cirrhosis with prior GI bleed and esophageal banding who had a fall at her memory care unit on October 8, 2021 that sent her to the [facility].  During her work-up at [facility] she was found to have a worsening subdural hematoma, urinary tract infection, RLE DVT along with recurrent ascites from chronic liver cirrhosis.  Neuros: Disoriented to time and situation, confused, slow to respond, generalized weakness, strengths 4/5.  IV: PIV saline locked  Labs/Electrolytes: Reviewed. AM lab draws.  Resp/trach: Lung sounds diminished in lower lobes  Diet: Regular with mildly thick liquids.  Bowel status: BS+, No BM this shift. LBM 10/10.  : Voiding spontaneously with intermittent incontinence.  Skin: Sacral Mepilex in place, generalized bruising  Pain: Denies  Activity: Up with A1, walker, gaitbelt  Social: Patient got upset overnight (2000) with how many meds they were given. Sleeping in between cares.  Plan: Palliative consult today, discharge to [facility] today. continue to monitor and follow POC.

## 2021-10-12 NOTE — CONSULTS
[facility]  Palliative Care Consultation Note    Patient: Jonas Rodriguez  Date of Admission:  10/9/2021    Requesting Clinician / Team: Gold 10  Reason for consult: Goals of care  Decisional support    Recommendations:  Patient's  is very interested in enrollment with hospice after discharge and to have hospice care at the current facility that the patient lives at.  A referral will need to be generated to hospice to confirm that patient meets criteria for hospice enrollment, but I think it is likely that she will qualify.  Of note, patient lives in the memory care unit of her facility and patient's  lives in the independent living unit of the same facility.      Recommend social work consult for hospice (entered for you). Patient's  would like to choose a hospice agency and have a referral generated prior to discharge from the hospital.      Patient is reported as otherwise medically ready to discharge today and she is very anxious to leave the hospital.  It could be reasonable to have patient discharged back to her facility once a hospice agency is selected and a referral was generated if no contraindication to this, defer to primary team.      Detailed CODE STATUS discussion completed today and patient's  designates CODE STATUS is DNI and DNR.  CODE STATUS in epic updated and a new POLST form was completed today with original plus copies provided to patient's  and copies added to her chart.      Patient appeared comfortable during our visit today and no complaints of symptoms expressed during her visit today from patient or her  who is bedside.    These recommendations have been discussed with primary team via note, phone.      Thank you for the opportunity to participate in the care of this patient and family. Our team: does not plan on following further, however do not hesitate to call or re-consult if we can be of further  assistance to the patient/family.     During regular M-F work hours -- if you are not sure who specifically to contact -- please contact us by sending a text page to our team consult pager at [phone].    After regular work hours and on weekends/holidays, you can call our answering service at [phone]. Also, who's on call for us is available in [employer] Web.     Total time spent was 75 minutes,  >50% of time was spent counseling and/or coordination of care regarding goals of care, advance care planning, decision support.    Migdalia Eng MD / Palliative Medicine / Pager [phone] / [facility] Inpatient Team Consult Pager [phone] (answered 8am-430pm M-F) - ok to text page via [email or web address] / After-Hours Answering Service [phone] / Palliative Clinic in the [facility] at the [facility] - [phone] (scheduling); [phone] (triage).      Assessments:  Joans Rodriguez is a 81 year old female with past medical history that includes advanced dementia with current residence in a locked memory care unit, liver cirrhosis with ascites and prior GI bleed and esophageal banding.  Patient has had a history of recurrent falls and was brought to [facility] ED on October 8 after a fall at her memory care unit, she was found to have subdural hematoma which showed some progression on imaging as well as a urinary tract infection, and acute right lower extremity DVT and recurrent ascites in the setting of her cirrhosis.  Patient transferred to [facility] for further evaluation and management.  Palliative care consulted for goals of care, met with patient and her  today 10/12 and patient's  interested in pursuing hospice enrollment for his wife.  Patient does not have capacity for complex decision-making on assessment today by her team.    Today, the patient was seen for:  Advanced dementia  Subdural hematoma  Recurrent falls  Liver cirrhosis complicated by ascites and prior GI bleed with  esophageal banding  Goals of care  Advance care planning  Decision support    Prognosis, Goals, & Planning:    Functional Status just prior to hospitalization: 4 (completely disabled and dependent on others for care; able to ambulate some steps with use of a walker although having very frequent falls       Prognosis, Goals, and/or Advance Care Planning were addressed today: Yes        Summary/Comments:       Patient's decision making preferences: unable to assess          Patient has decision-making capacity today for complex decisions: No            I have concerns about the patient/family's health literacy today: No           Patient has a completed Health Care Directive: No.       Code status: No CPR / No Intubation    Coping, Meaning, & Spirituality:   Mood, coping, and/or meaning in the context of serious illness were addressed today: Yes  Summary/Comments: Patient's  expressed good understanding today of patient's illness and the progressive nature of dementia.    Social:     Living situation: Patient living in a locked memory care unit    Key family / caregivers: Memory care unit staff,  lives at same facility in the independent living unit    History of Present Illness:  History gathered today from: family/loved ones, medical chart, medical team members    Jonas Rodriguez is a 81 year old female with past medical history that includes advanced dementia with current residence in a locked memory care unit, liver cirrhosis with ascites and prior GI bleed and esophageal banding.  Patient has had a history of recurrent falls and was brought to [facility] ED on October 8 after a fall at her memory care unit, she was found to have subdural hematoma which showed some progression on imaging as well as a urinary tract infection, and acute right lower extremity DVT and recurrent ascites in the setting of her cirrhosis.  Patient transferred to [facility] for further evaluation and management.   "Palliative care consulted for goals of care, met today with patient and her  today 10/12 and patient's  interested in pursuing hospice enrollment for his wife.  We discussed that patient's dementia diagnosis is a chronic progressive disease which will continue to worsen over time.  Patient's  notes that when patient becomes sicker or has another clinical decline or issue, that he would like her to be treated at her facility and not return to the hospital and that if she were to continue to become sicker that he would want aggressive symptom management and to allow a natural death at her care facility.  Patient does not have capacity for complex decision-making on assessment today by her team.  Detailed CODE STATUS discussion completed today and patient's  designates DNI and DNR CODE STATUS and is interested in updating patient's POLST form today.  Patient's  notes the patient has had continued functional decline with frequent falls at her memory care unit.  She does at times feed herself \"if she likes with in front of her\", she at times will speak in very short sentences and repeat back/echo responses but mainly answers with one-word answers if she speaks at all but at times vocalizes without using words when people are talking with her.  Patient is able to stand with use of a walker but falls frequently.  She has urinary incontinence and has had UTI difficulties.  Her weight trend is confounded by ascites and volume status changes associated with her liver cirrhosis.    Key Palliative Symptom Data:  We are not helping to manage these symptoms currently in this patient.    Patient is on opioids: bowels not assessed today.    ROS:  Comprehensive ROS unable to be completed due to patient's confusion and mental status.     Past Medical History:  Past Medical History:   Diagnosis Date     Anxiety      Autoimmune disorder (H)      Cancer (H)      Diabetes mellitus (H)      Elbow " abrasion 10/6/2013     Fall from standing 10/6/2013     Gastro-oesophageal reflux disease      Hypercholesteraemia      Liver disorder      Vitamin deficiency         Past Surgical History:  No significant past surgical history reported     Family History:  No significant family history reported     Allergies:  Allergies   Allergen Reactions     Lisinopril Angioedema        Medications:  I have reviewed this patient's medication profile and medications from this hospitalization.     Physical Exam:  Vital Signs: Temp: 98.5  F (36.9  C) Temp src: Axillary BP: 117/56 Pulse: 60   Resp: 14 SpO2: 98 % O2 Device: None (Room air)    Weight: 131 lbs 9.83 oz  Gen: NAD, sitting up in a chair, makes eye contact   HEENT: normocephalic, no scleral icterus, no perioral lesions, oral mucosa moist  CV: RRR at time of exam  Pulm: good air movement bilaterally without wheezing  Abd: soft, +bs, nontender to palpation diffusely, nondistended  LE: no edema bilaterally  Skin: no rash or jaundice on limited exam  Neuro: Alert, paucity of spontaneous speech, moves all extremities spontaneously  Psych: Blunted mood and affect      Data reviewed:  Recent imaging reviewed, my comments on pertinents:     10/9/2021 CT head without contrast   IMPRESSION (per radiology):  1.  Bilateral left larger than right subdural hematomas are unchanged compared with the examination from earlier today.  2.  There is probably a small amount of subdural blood along the interhemispheric fissure that is new or increased compared to the CT earlier today.    Recent lab data reviewed, my comments on pertinents:   Today, creatinine 0.78, sodium 142, potassium 4.2, WBC 3.1, hemoglobin 8.2, platelets 100.

## 2021-10-12 NOTE — DISCHARGE SUMMARY
[facility]  Hospitalist Discharge Summary      Date of Admission:  10/9/2021  Date of Discharge:  10/12/2021  Discharging Provider: Ross Linares MD  Discharge Team: Hospitalist Service, Gold 10    Discharge Diagnoses     Please see Hospital Course below     Follow-ups Needed After Discharge   Follow-up Appointments     Follow Up and recommended labs and tests      Follow up with Nursing home physician.  No follow up labs or test are   needed.    Follow up with Hospice Agency at your facility, information provided by   Care Coordinator             Unresulted Labs Ordered in the Past 30 Days of this Admission     Date and Time Order Name Status Description    10/10/2021  5:49 AM Ascites Fluid Aerobic Bacterial Culture Routine Preliminary     10/8/2021  3:09 PM Blood Culture Peripheral Blood Preliminary     10/8/2021  3:09 PM Blood Culture Peripheral Blood Preliminary       These results will be followed up by Ordering Physician     Discharge Disposition   Discharged to assisted living  Condition at discharge: Stable    Hospital Course   Jonas Rodriguez is a 81 year old female admitted on 10/9/2021. She has a past medical history of advanced dementia, liver cirrhosis with prior GI bleed and esophageal banding who had a fall at her memory care unit on October 8, 2021 that sent her to the [facility].  During her work-up at [facility] she was found to have a worsening subdural hematoma, urinary tract infection, reported RLE DVT along with recurrent ascites from chronic liver cirrhosis and was transferred from [facility] to [facility] for further evaluation by Neurosurgery and Gastroenterology. The following problems were addressed while inpatient:      Today:  -Cont oral antibiotics (Klebsiella in urine is pansusceptible)   -Neurosurg still not recommending intervention for subdural hematoma  -Palliative saw pt and , discussed Hospice and  would like to pursue  -SW consulted for hospice referral      Subacute subdural hematoma along left cerebral convexity 9mm with right sided midline shift  -CT head at [facility] from 10/8/21 revealed subdural hematoma that on repeat head CT today at 11:23AM confirmed no acute changes from earlier in the day.  Neurosurgery was consulted and felt it would be prudent to see her and arranged to her admitted to [facility] and transferred to [facility]. In further discussion with the patient and her , it is clear that she would not want neurosurgery, even in a life threatening situation. NSG did not recommend any intervention, she was monitored clinically and with serial Hgb which was stable.   -Neurosurgery doesn't recommend surgery at this time and they confirmed that even if the bleeding enlarged and she was symptomatic-- patient/family wouldn't want surgery  -Palliative consulted, discussed hospice and pt/family would like to pursue   -Hospice agency contacted, will continue to evaluate patient at her facility      Normocytic anemia, unclear baseline  Last known hgb was 11 in 2013. I am not able to find more recent data in our records or outside records. Given that there is no obvious overt bleeding (no melena, hematochezia, not bloody ascites on tap, SDH is not large to accomodate that much blood)-- I suspect this has been a slow gradual drop due to anemia of chronic disease vs iron deficiency or both. Hgb has remained quite stable after transfusion  -No signs of hemolysis on labs  -No signs of DIC   -No signs of active bleeding except for chronic SDH     Chronic thrombocytopenia secondary to liver disease- given that other cell counts are down-- will get smear- still pending     Leukopenia- appears to have been present as far back as 2013-- unclear etiology appears to be low lymphocytes     Liver cirrhosis with a history of esophageal varices s/p banding (per  cirrhosis may due to hepatitis as a  child)  Moderate ascites  SBP ruled out  She has a known history of liver cirrhosis with esophageal varices and has had banding in the past. Per [place] [facility] records (GI able to access) - had EGD >1 year ago and after banding, the repeat EGD showed no varices. Brisk upper bleed is thought to be unlikely. Lower GI bleed also not thought to be likely and the colonic thickening on CT is thought to be due to vascular congestion due to portal hypertension. MELD is 11  -Gastroenterology consult, appreciate assistance  -transitioned to oral PPI today  -hold nadalol if HR due to lack of varcies on recent EGD, due to bradycardia and risk for falls at home  -Counts not indicating SBP (will watch cultures)-- only 48 cells total in the fluid (mostly macros/monos/mesothelial cells)     Advanced dementia  -She lives in a memory care facility ([facility]) and reports feeling safe there.  Continue home regimen of Aricept 10 mg a mouth once daily, Namenda 10 mg by mouth twice daily  -Per PT she is requiring assist with all mobility and with ADLs   -Is incontinent  -Has had two unwitnessed falls. The facility she is at is locked and supervised but nurses are only there until 4:30 PM. There is primary available for consultation but isn't on site  -Pt and family were recommended to discuss Hospice given pt's advanced dementia and medical conditions. Palliative consulted, discussed Hospice and they elected to pursue   -SW consulted  -Her  is her primary decision maker and can be reached on cell phone or his home phone for any consents needed for procedures or change in plan of care.     Hypoglycemia: likely due to poor PO  -Hypoglycemia protocol  -Nutrition assessment     Urinary tract infection  -UA in the ER at [facility] revealed high protein, nitrites, bacteria and mucous but no blood. Urine culture is growing >100,000 colonies Klebsiella, resistant only to ampicillin  -Received ceftriaxone 10/7, 10/9,  10/10  --transition to oral cefdinir 10/11 and treat through 10/15 for 7 days from 10/9     Right lower extremity gastrocnemius clot   - Low risk for embolization, contraindication for AC  - No need for IVC filter per IR     Depression  -Continue home regimen of Zoloft 150 mg by mouth once daily     Hyperlipidemia  -Continue Zocor 20 mg by mouth once daily in the evening    Consultations This Hospital Stay   INTERNAL MEDICINE PROCEDURE TEAM ADULT IP CONSULT [place] - DIALYSIS NON TUNNELED CENTRAL LINE PLACEMENT  PHYSICAL THERAPY ADULT IP CONSULT  INTERVENTIONAL RADIOLOGY ADULT/PEDS IP CONSULT  GI LUMINAL ADULT IP CONSULT  NEUROSURGERY ADULT IP CONSULT  VASCULAR ACCESS CARE ADULT IP CONSULT  VASCULAR ACCESS CARE ADULT IP CONSULT  VASCULAR ACCESS CARE ADULT IP CONSULT  VASCULAR ACCESS CARE ADULT IP CONSULT  WOUND OSTOMY CONTINENCE NURSE  IP CONSULT  SPEECH LANGUAGE PATH ADULT IP CONSULT  NUTRITION SERVICES ADULT IP CONSULT  INTERVENTIONAL RADIOLOGY ADULT/PEDS IP CONSULT  PALLIATIVE CARE ADULT IP CONSULT  CARE MANAGEMENT / SOCIAL WORK IP CONSULT  SOCIAL WORK IP CONSULT    Code Status   No CPR- Do NOT Intubate    Time Spent on this Encounter   I, Ross Linares MD, personally saw the patient today and spent less than or equal to 30 minutes discharging this patient.       Ross Linares MD  [facility] [address] [address]  Phone: [phone]  ______________________________________________________________________    Physical Exam   Vital Signs: Temp: 98.5  F (36.9  C) Temp src: Axillary BP: 117/56 Pulse: 60   Resp: 14 SpO2: 98 % O2 Device: None (Room air)    Weight: 131 lbs 9.83 oz    Constitutional: Awake, alert, cooperative, no apparent distress  Respiratory: Clear to auscultation bilaterally, no crackles or wheezing  Cardiovascular: Regular rate and rhythm, normal S1 and S2, and no murmur noted, no edema  GI: Distended, Normal bowel sounds, soft  Skin/Integumen: No  rashes, no cyanosis       Primary Care Physician   Physician No Ref-Primary    Discharge Orders      General info for SNF    Length of Stay Estimate: Long Term Care  Condition at Discharge: Stable  Level of care:board and care  Rehabilitation Potential: Fair  Admission H&P remains valid and up-to-date: Yes  Recent Chemotherapy: N/A  Use Nursing Home Standing Orders: Yes     Mantoux instructions    Give two-step Mantoux (PPD) Per Facility Policy Yes     Follow Up and recommended labs and tests    Follow up with Nursing home physician.  No follow up labs or test are needed.    Follow up with Hospice Agency at your facility, information provided by Care Coordinator     Reason for your hospital stay    You were hospitalized for falls and brain bleed. Also note to have bladder infection.     Activity - Up with nursing assistance     No CPR- Do NOT Intubate     Fall precautions     Advance Diet as Tolerated    Follow this diet upon discharge: Orders Placed This Encounter      Snacks/Supplements Adult: Other; Vital Cuisine 500 (vanilla) at 10am snack and magic cup (vanilla) at HS snack; Between Meals      Regular Diet Adult Mildly Thick (level 2)       Significant Results and Procedures   Most Recent 3 CBC's:Recent Labs   Lab Test 10/12/21  0908 10/11/21  0702 10/10/21  1413 10/10/21  0602 10/10/21  0602   WBC 3.1* 3.2*  3.2*  --   --  2.9*   HGB 8.2* 7.4*  7.4* 8.0*   < > 7.4*   MCV 81 80  82  --   --  80   * 95*  112*  --   --  90*    < > = values in this interval not displayed.     Most Recent 3 BMP's:Recent Labs   Lab Test 10/12/21  0908 10/12/21  0744 10/11/21  1240 10/11/21  0718 10/11/21  0702 10/10/21  0649 10/10/21  0602     --   --   --  143  --  143   POTASSIUM 4.2  --   --   --  3.8  --  3.8   CHLORIDE 113*  --   --   --  114*  --  113*   CO2 25  --   --   --  24  --  26   BUN 18  --   --   --  20  --  19   CR 0.78  --   --   --  0.85  --  0.83   ANIONGAP 4  --   --   --  5  --  4   KISHAN 7.8*   --   --   --  7.7*  --  7.7*   * 96 131*   < > 109*   < > 63*    < > = values in this interval not displayed.     Most Recent 2 LFT's:Recent Labs   Lab Test 10/12/21  0908 10/11/21  0702   AST 40 43   ALT 24 21   ALKPHOS 65 62   BILITOTAL 0.8 0.5   ,   Results for orders placed or performed during the hospital encounter of 10/09/21   POC US Guide for Paracentesis    Impression    LLQ area of paracentesis       Discharge Medications   Current Discharge Medication List      START taking these medications    Details   cefdinir (OMNICEF) 125 MG/5ML suspension Take 12 mLs (300 mg) by mouth 2 times daily for 3 days  Qty: 72 mL, Refills: 0    Associated Diagnoses: Urinary tract infection without hematuria, site unspecified         CONTINUE these medications which have NOT CHANGED    Details   bacitracin ointment Apply topically 2 times daily    Associated Diagnoses: Elbow abrasion, left, initial encounter      busPIRone (BUSPAR) 5 MG tablet Take 5 mg by mouth daily      calcium carbonate-vitamin D 600-200 MG-UNIT TABS Take 1 tablet by mouth 2 times daily Crush and mix with food      donepezil (ARICEPT) 10 MG tablet Take 10 mg by mouth daily       memantine (NAMENDA) 10 MG tablet Take 10 mg by mouth 2 times daily      omeprazole (PRILOSEC) 20 MG DR capsule Take 20 mg by mouth every morning (before breakfast) Open capsule and mix with food      sertraline (ZOLOFT) 100 MG tablet Take 150 mg by mouth daily Crush and mix with food      simvastatin (ZOCOR) 20 MG tablet Take 20 mg by mouth every evening At 1630. Crush and mix with food      vitamin D3 (VITAMIN D3) 50 mcg (2000 units) tablet Take 2,000 Units by mouth daily          STOP taking these medications       ibuprofen (ADVIL/MOTRIN) 200 MG tablet Comments:   Reason for Stopping:         nadolol (CORGARD) 40 MG tablet Comments:   Reason for Stopping:         nadolol (CORGARD) 40 MG tablet Comments:   Reason for Stopping:             Allergies   Allergies   Allergen  Reactions     Lisinopril Angioedema

## 2021-10-13 LAB
BACTERIA BLD CULT: NO GROWTH
BACTERIA BLD CULT: NO GROWTH

## 2021-10-13 NOTE — PLAN OF CARE
Status: Patient admitted on 10/9 with history of advanced dementia, liver cirrhosis with prior GI bleed and esophageal banding who had a fall at her memory care unit on October 8, 2021 that sent her to the [facility].  During her work-up at [facility] she was found to have a worsening subdural hematoma, urinary tract infection, RLE DVT along with recurrent ascites from chronic liver cirrhosis and was transferred from [facility] to [facility] for further evaluation by Neurosurgery and Gastroenterology.  Vitals: VSS  Neuros: Disoriented to place, time, situation, confused, slow to respond, generalized weakness, strengths 4/5  IV: PIV removed  Labs/Electrolytes: WNL, last hgb 8.2  Resp/trach: Lung sounds diminished in lower lobes  Diet: Regular with mildly thick liquids  Bowel status: Last BM yesterday, BS+  : Voiding spontaneously with occasional incontinence  Skin: Sacral Mepilex in place, generalized bruising  Pain: Denies  Activity: Up with A1, walker, gaitbelt  Social: Cooperative with cares,  Zhang at bedside supportive  Plan: Patient to discharge on hospice, phone report given to KRISTY Grier at [facility], patient transported to front door via wheelchair, discharge instructions given to  in writing and faxed to memory care unit by care coordinator Christine

## 2021-10-13 NOTE — PLAN OF CARE
Speech Language Therapy Discharge Summary    Reason for therapy discharge:    Discharged to home.    Progress towards therapy goal(s). See goals on Care Plan in [facility] electronic health record for goal details.  Goals not met.  Barriers to achieving goals:   discharge from facility.    Therapy recommendation(s):    No further therapy is recommended.      Previous ST recommendations for mildly thick liquids (2) and continue regular solids (7). Pt to sit upright with PO, take small bites/sips, and alternate liquids/solids. Pt benefits from setup assistance with tray. However, pt has transitioned to hospice, so MD to liberalize diet as appropriate.

## 2021-10-14 ENCOUNTER — DOCUMENTATION ONLY (OUTPATIENT)
Dept: OTHER | Facility: CLINIC | Age: 81
End: 2021-10-14

## 2021-10-15 LAB — BACTERIA FLD CULT: NO GROWTH

## 2021-11-03 DIAGNOSIS — Z11.59 ENCOUNTER FOR SCREENING FOR OTHER VIRAL DISEASES: ICD-10-CM

## 2021-11-14 ENCOUNTER — LAB REQUISITION (OUTPATIENT)
Dept: LAB | Facility: CLINIC | Age: 81
End: 2021-11-14
Payer: MEDICARE

## 2021-11-14 DIAGNOSIS — U07.1 COVID-19: ICD-10-CM

## 2021-11-14 PROCEDURE — U0003 INFECTIOUS AGENT DETECTION BY NUCLEIC ACID (DNA OR RNA); SEVERE ACUTE RESPIRATORY SYNDROME CORONAVIRUS 2 (SARS-COV-2) (CORONAVIRUS DISEASE [COVID-19]), AMPLIFIED PROBE TECHNIQUE, MAKING USE OF HIGH THROUGHPUT TECHNOLOGIES AS DESCRIBED BY CMS-2020-01-R: HCPCS | Mod: ORL | Performed by: NURSE PRACTITIONER

## 2021-11-15 LAB — SARS-COV-2 RNA RESP QL NAA+PROBE: NEGATIVE

## 2021-11-17 ENCOUNTER — HOSPITAL ENCOUNTER (OUTPATIENT)
Dept: ULTRASOUND IMAGING | Facility: CLINIC | Age: 81
Discharge: HOME | End: 2021-11-17
Attending: NURSE PRACTITIONER | Admitting: RADIOLOGY
Payer: OTHER MISCELLANEOUS

## 2021-11-17 DIAGNOSIS — R18.8 OTHER ASCITES: ICD-10-CM

## 2021-11-17 PROCEDURE — 49083 ABD PARACENTESIS W/IMAGING: CPT
